# Patient Record
Sex: FEMALE | Race: OTHER | HISPANIC OR LATINO | URBAN - METROPOLITAN AREA
[De-identification: names, ages, dates, MRNs, and addresses within clinical notes are randomized per-mention and may not be internally consistent; named-entity substitution may affect disease eponyms.]

---

## 2021-05-01 ENCOUNTER — OUTPATIENT (OUTPATIENT)
Dept: OUTPATIENT SERVICES | Facility: HOSPITAL | Age: 81
LOS: 1 days | End: 2021-05-01
Payer: MEDICAID

## 2021-05-14 ENCOUNTER — INPATIENT (INPATIENT)
Facility: HOSPITAL | Age: 81
LOS: 7 days | Discharge: HOME | DRG: 64 | End: 2021-05-22
Attending: HOSPITALIST | Admitting: HOSPITALIST
Payer: MEDICAID

## 2021-05-14 VITALS
WEIGHT: 126.1 LBS | DIASTOLIC BLOOD PRESSURE: 83 MMHG | RESPIRATION RATE: 20 BRPM | HEART RATE: 77 BPM | SYSTOLIC BLOOD PRESSURE: 143 MMHG | OXYGEN SATURATION: 100 %

## 2021-05-14 DIAGNOSIS — R13.10 DYSPHAGIA, UNSPECIFIED: ICD-10-CM

## 2021-05-14 DIAGNOSIS — R41.82 ALTERED MENTAL STATUS, UNSPECIFIED: ICD-10-CM

## 2021-05-14 LAB
ALBUMIN SERPL ELPH-MCNC: 3.1 G/DL — LOW (ref 3.5–5)
ALP SERPL-CCNC: 175 U/L — HIGH (ref 40–120)
ALT FLD-CCNC: 31 U/L DA — SIGNIFICANT CHANGE UP (ref 10–60)
ANION GAP SERPL CALC-SCNC: 6 MMOL/L — SIGNIFICANT CHANGE UP (ref 5–17)
APPEARANCE UR: ABNORMAL
AST SERPL-CCNC: 24 U/L — SIGNIFICANT CHANGE UP (ref 10–40)
BACTERIA # UR AUTO: ABNORMAL /HPF
BASOPHILS # BLD AUTO: 0.03 K/UL — SIGNIFICANT CHANGE UP (ref 0–0.2)
BASOPHILS NFR BLD AUTO: 0.4 % — SIGNIFICANT CHANGE UP (ref 0–2)
BILIRUB SERPL-MCNC: 0.9 MG/DL — SIGNIFICANT CHANGE UP (ref 0.2–1.2)
BILIRUB UR-MCNC: NEGATIVE — SIGNIFICANT CHANGE UP
BUN SERPL-MCNC: 16 MG/DL — SIGNIFICANT CHANGE UP (ref 7–18)
CALCIUM SERPL-MCNC: 8.9 MG/DL — SIGNIFICANT CHANGE UP (ref 8.4–10.5)
CHLORIDE SERPL-SCNC: 101 MMOL/L — SIGNIFICANT CHANGE UP (ref 96–108)
CO2 SERPL-SCNC: 26 MMOL/L — SIGNIFICANT CHANGE UP (ref 22–31)
COLOR SPEC: YELLOW — SIGNIFICANT CHANGE UP
COMMENT - URINE: SIGNIFICANT CHANGE UP
CREAT SERPL-MCNC: 0.78 MG/DL — SIGNIFICANT CHANGE UP (ref 0.5–1.3)
DIFF PNL FLD: ABNORMAL
EOSINOPHIL # BLD AUTO: 0.07 K/UL — SIGNIFICANT CHANGE UP (ref 0–0.5)
EOSINOPHIL NFR BLD AUTO: 0.9 % — SIGNIFICANT CHANGE UP (ref 0–6)
EPI CELLS # UR: SIGNIFICANT CHANGE UP /HPF
GLUCOSE SERPL-MCNC: 292 MG/DL — HIGH (ref 70–99)
GLUCOSE UR QL: 1000 MG/DL
HCT VFR BLD CALC: 40.3 % — SIGNIFICANT CHANGE UP (ref 34.5–45)
HGB BLD-MCNC: 14.2 G/DL — SIGNIFICANT CHANGE UP (ref 11.5–15.5)
IMM GRANULOCYTES NFR BLD AUTO: 0.5 % — SIGNIFICANT CHANGE UP (ref 0–1.5)
KETONES UR-MCNC: NEGATIVE — SIGNIFICANT CHANGE UP
LEUKOCYTE ESTERASE UR-ACNC: ABNORMAL
LYMPHOCYTES # BLD AUTO: 1.62 K/UL — SIGNIFICANT CHANGE UP (ref 1–3.3)
LYMPHOCYTES # BLD AUTO: 21.7 % — SIGNIFICANT CHANGE UP (ref 13–44)
MCHC RBC-ENTMCNC: 31.3 PG — SIGNIFICANT CHANGE UP (ref 27–34)
MCHC RBC-ENTMCNC: 35.2 GM/DL — SIGNIFICANT CHANGE UP (ref 32–36)
MCV RBC AUTO: 89 FL — SIGNIFICANT CHANGE UP (ref 80–100)
MONOCYTES # BLD AUTO: 0.54 K/UL — SIGNIFICANT CHANGE UP (ref 0–0.9)
MONOCYTES NFR BLD AUTO: 7.2 % — SIGNIFICANT CHANGE UP (ref 2–14)
NEUTROPHILS # BLD AUTO: 5.16 K/UL — SIGNIFICANT CHANGE UP (ref 1.8–7.4)
NEUTROPHILS NFR BLD AUTO: 69.3 % — SIGNIFICANT CHANGE UP (ref 43–77)
NITRITE UR-MCNC: NEGATIVE — SIGNIFICANT CHANGE UP
NRBC # BLD: 0 /100 WBCS — SIGNIFICANT CHANGE UP (ref 0–0)
PH UR: 6 — SIGNIFICANT CHANGE UP (ref 5–8)
PLATELET # BLD AUTO: 237 K/UL — SIGNIFICANT CHANGE UP (ref 150–400)
POTASSIUM SERPL-MCNC: 4 MMOL/L — SIGNIFICANT CHANGE UP (ref 3.5–5.3)
POTASSIUM SERPL-SCNC: 4 MMOL/L — SIGNIFICANT CHANGE UP (ref 3.5–5.3)
PROT SERPL-MCNC: 7.4 G/DL — SIGNIFICANT CHANGE UP (ref 6–8.3)
PROT UR-MCNC: 100
RBC # BLD: 4.53 M/UL — SIGNIFICANT CHANGE UP (ref 3.8–5.2)
RBC # FLD: 12.5 % — SIGNIFICANT CHANGE UP (ref 10.3–14.5)
RBC CASTS # UR COMP ASSIST: ABNORMAL /HPF (ref 0–2)
SARS-COV-2 RNA SPEC QL NAA+PROBE: SIGNIFICANT CHANGE UP
SODIUM SERPL-SCNC: 133 MMOL/L — LOW (ref 135–145)
SP GR SPEC: 1.01 — SIGNIFICANT CHANGE UP (ref 1.01–1.02)
UROBILINOGEN FLD QL: NEGATIVE — SIGNIFICANT CHANGE UP
WBC # BLD: 7.46 K/UL — SIGNIFICANT CHANGE UP (ref 3.8–10.5)
WBC # FLD AUTO: 7.46 K/UL — SIGNIFICANT CHANGE UP (ref 3.8–10.5)
WBC UR QL: ABNORMAL /HPF (ref 0–5)

## 2021-05-14 PROCEDURE — 99285 EMERGENCY DEPT VISIT HI MDM: CPT

## 2021-05-14 PROCEDURE — 99222 1ST HOSP IP/OBS MODERATE 55: CPT

## 2021-05-14 PROCEDURE — 71045 X-RAY EXAM CHEST 1 VIEW: CPT | Mod: 26

## 2021-05-14 PROCEDURE — 70450 CT HEAD/BRAIN W/O DYE: CPT | Mod: 26,MA

## 2021-05-14 RX ORDER — ENOXAPARIN SODIUM 100 MG/ML
40 INJECTION SUBCUTANEOUS DAILY
Refills: 0 | Status: DISCONTINUED | OUTPATIENT
Start: 2021-05-14 | End: 2021-05-19

## 2021-05-14 RX ORDER — HALOPERIDOL DECANOATE 100 MG/ML
5 INJECTION INTRAMUSCULAR ONCE
Refills: 0 | Status: COMPLETED | OUTPATIENT
Start: 2021-05-14 | End: 2021-05-14

## 2021-05-14 RX ORDER — INSULIN LISPRO 100/ML
VIAL (ML) SUBCUTANEOUS
Refills: 0 | Status: DISCONTINUED | OUTPATIENT
Start: 2021-05-14 | End: 2021-05-15

## 2021-05-14 RX ADMIN — HALOPERIDOL DECANOATE 5 MILLIGRAM(S): 100 INJECTION INTRAMUSCULAR at 21:14

## 2021-05-14 RX ADMIN — HALOPERIDOL DECANOATE 5 MILLIGRAM(S): 100 INJECTION INTRAMUSCULAR at 19:00

## 2021-05-14 RX ADMIN — Medication 2 MILLIGRAM(S): at 21:14

## 2021-05-14 NOTE — ED PROVIDER NOTE - CLINICAL SUMMARY MEDICAL DECISION MAKING FREE TEXT BOX
Patient presenting for weakness. no objective weakness here but noting ams. per patient's daughter, patient weakness worsening over past 2 days. will obtain lab, ua, ct. assess for infection vs stroke. ed obs and reassess

## 2021-05-14 NOTE — H&P ADULT - NSHPREVIEWOFSYSTEMS_GEN_ALL_CORE
GENERAL:  HEENT:  RESP:  CV:  GI:  :  MSK:  EXT:  NEURO:  PSYCH:  SKIN: unable to obtain 2/2 sedation

## 2021-05-14 NOTE — ED PROVIDER NOTE - OBJECTIVE STATEMENT
80 y.o presenting for weakness. history taken from patient daughter. per patient daughter, noting left sided weakness x 2 years but worse in the past 2 days. no cp, n, v, abd pain noted.

## 2021-05-14 NOTE — H&P ADULT - NSHPPHYSICALEXAM_GEN_ALL_CORE
HR 77, /83, RR 20, SpO2 100%  GENERAL:  HEENT:  RESP:  CV:  GI:  :  MSK:  EXT:  NEURO:  PSYCH:  SKIN: T 97.6, HR 77, /83, RR 20, SpO2 100%  GENERAL:  HEENT:  RESP:  CV:  GI:  :  MSK:  EXT:  NEURO:  PSYCH:  SKIN: T 97.6, HR 77, /83, RR 20, SpO2 100%  GENERAL: sedated  HEENT: NCAT  RESP: deep, even breathing, lungs CTA b/l  CV: RRR  GI: soft, ND  MSK: atraumatic   EXT: no edema noted   NEURO: sedated, retracts to pain (s/p 10mg Haldol)  PSYCH: unable to assess  SKIN: no lesions noted, intact, warm

## 2021-05-14 NOTE — H&P ADULT - PROBLEM SELECTOR PLAN 5
-s/p COVID infx 11/2020  -adm COVID PCR negative   -CXR with mild b/l infiltrates, possibly residual finding

## 2021-05-14 NOTE — H&P ADULT - HISTORY OF PRESENT ILLNESS
Na 133, ,  UA+ COVID negative. CXR mild b/l infiltrates. CTH with chronic l frontal lobe and r inferoir cerebellar lacunar infarcts, no acute findings.      80F from home with daughters, arrived from [facility] 2/21, ambulates only with assistance with PMH DM, HTN, CVA 2019 (residual r-sided weakness), COVID 11/20, and Alzheimer dementia (baseline AAOx0-2), sent by PCP Dr. Gilmore 5/14 for nonspecific reports of "feeling poorly", cognitive decline, and x2 days questionably worsening weakness (daughter reported right side, ED note reported left side, will continue to clarify). In ED, patient agitated, confused, disoriented, given 10mg Haldol IV and 2mg Ativan IV, unable to provide history, obtained from daughter Leeanne.     In ED, BP mildly elevated 143/83, VS otherwise wnl. Adm Na 133, , , +UA. COVID negative. CXR mild b/l infiltrates. CTH with chronic l frontal lobe and r inferior cerebellar lacunar infarcts, no acute findings. Admitted to tele for workup TIA vs CVA.      80F from home with daughters, arrived from [facility] 2/21, ambulates only with assistance with PMH DM, HTN, CVA 2019 (residual r-sided weakness), COVID 11/20, and Alzheimer dementia (baseline AAOx0-2), sent by PCP Dr. Gilmore 5/14 for nonspecific reports of "feeling poorly", cognitive decline, and x2 days questionably worsening weakness (daughter reported right side, ED note reported left side, will continue to clarify). In ED, patient agitated, confused, disoriented, given 10mg Haldol IV and 2mg Ativan IV, unable to provide history, obtained from daughter Leeanne.     In ED, BP mildly elevated 143/83, VS otherwise wnl. Adm Na 133, , , +UA. COVID negative. EKG NSR with incomplete RBBB. CXR mild b/l infiltrates. CTH with chronic l frontal lobe and r inferior cerebellar lacunar infarcts, no acute findings. Admitted to [facility] for workup TIA vs CVA.      80F from home with daughters, arrived from [facility] 2/21, ambulates only with assistance with PMH DM, HTN, CVA 2019 (residual l-sided weakness), COVID 11/20, and Alzheimer dementia (baseline AAOx0-2), sent by PCP Dr. Gilmore 5/14 for nonspecific reports of "feeling poorly", cognitive decline, and x2 days questionably worsening l-sided weakness. In ED, patient agitated, confused, disoriented, given 10mg Haldol IV and 2mg Ativan IV, unable to provide history, obtained from daughter Leeanne.     In ED, BP mildly elevated 143/83, VS otherwise wnl. Adm Na 133, , , +UA. COVID negative. EKG NSR with incomplete RBBB. CXR mild b/l infiltrates. CTH with chronic l frontal lobe and r inferior cerebellar lacunar infarcts, no acute findings. Admitted to [facility] for workup of worsening mental status.

## 2021-05-14 NOTE — H&P ADULT - PROBLEM SELECTOR PLAN 3
PMH DM, no home meds per daughter 2/2 "insurance issues"  -adm   -started HSS q6 while NPO  -fu a1c  -once diet started, diabetic/DASH  -fu SW consult for insurance issues PMH DM, no home meds per daughter 2/2 "insurance issues"  -adm   -started HSS q6 while NPO  -fu a1c  -once diet started, diabetic/DASH  -primary team to consult endo in am  -fu SW consult for insurance issues

## 2021-05-14 NOTE — ED ADULT NURSE NOTE - CHIEF COMPLAINT QUOTE
sent from the doctors office for r/o stroke , as per daughter Jazmyn pt had been having weakness ever since she came back from [facility]  2 months ago

## 2021-05-14 NOTE — ED ADULT TRIAGE NOTE - CHIEF COMPLAINT QUOTE
sent from the doctors office for r/o stroke , as per daughter Jazmyn pt had been like this ever since she came back from [facility]  2 months ago sent from the doctors office for r/o stroke , as per daughter Jazmyn pt had been having weakness ever since she came back from [facility]  2 months ago

## 2021-05-14 NOTE — ED ADULT NURSE NOTE - INTERVENTIONS DEFINITIONS
Stretcher in lowest position, wheels locked, appropriate side rails in place/Monitor gait and stability

## 2021-05-14 NOTE — H&P ADULT - PROBLEM SELECTOR PLAN 2
-adm +UA, patient unable to respond regarding symptoms  -fu UCx, BCx  -give x3 days cftx empirically

## 2021-05-14 NOTE — H&P ADULT - ASSESSMENT
80F from home with daughters, arrived from [facility] 2/21, ambulates only with assistance with PMH DM, HTN, CVA 2019 (residual r-sided weakness), COVID 11/20, and Alzheimer dementia (baseline AAOx0-2),   In ED, BP mildly elevated 143/83, VS otherwise wnl. Adm Na 133, , , +UA. COVID negative.. CXR mild b/l infiltrates. . Admitted to tele for workup TIA vs CVA.        80F from home with daughters, arrived from [facility] 2/21, ambulates only with assistance with PMH DM, HTN, CVA 2019 (residual l-sided weakness), COVID 11/20, and Alzheimer dementia (baseline AAOx0-2), sent by PCP Dr. Gilmore 5/14 for nonspecific reports of "feeling poorly", cognitive decline, and x2 days questionably worsening weakness, admitted to tele for workup of worsening mental status.

## 2021-05-14 NOTE — H&P ADULT - NSHPSOCIALHISTORY_GEN_ALL_CORE
lives with daughters  unable to ambulate without assistance lives with daughters  unable to ambulate without assistance  AAOx0-2 at baseline  never smoker, no EtOH, no illicit drugs  recently arrived from [facility], 2/21

## 2021-05-14 NOTE — H&P ADULT - PROBLEM SELECTOR PLAN 1
sent by PCP Dr. Gilmore 5/14 for nonspecific reports of "feeling poorly", cognitive decline, and x2 days questionably worsening weakness (daughter reported right side, ED note reported left side, will continue to clarify) -s/p Haldol 10mg IV and Ativan 2mg IV 2/2 agitation in ED  -EKG NSR with incomplete RBBB  -CTH with chronic l frontal lobe and r inferior cerebellar lacunar infarcts, no acute findings  -may consider further evaluation with repeat CTH or MRI 5/15pm  -fu carotid doppler and TTE given bilaterality of previous CVAs sent by PCP Dr. Gilmore 5/14 for nonspecific reports of "feeling poorly", cognitive decline, and x2 days questionably worsening weakness (daughter reported right side, ED note reported left side, will continue to clarify)   -ddx CVA/TIA vs infectious (see below) vs progression of Amzheimer's dz  -s/p Haldol 10mg IV and Ativan 2mg IV 2/2 agitation in ED  -EKG NSR with incomplete RBBB  -CTH with chronic l frontal lobe and r inferior cerebellar lacunar infarcts, no acute findings  -may consider further evaluation with repeat CTH, CTA H/N or MRI 5/15pm  -fu carotid doppler (if CTA H/N not obtained) and TTE given bilaterality of previous CVAs  -start asa, statin, and lisinopril for secondary prevention   -fu lipid profile, folate, B12, TSH  -NPO until SS eval (except meds, ice chips)  -fu PT recs sent by PCP Dr. Gilmore 5/14 for nonspecific reports of "feeling poorly", cognitive decline, and x2 days questionably worsening weakness (daughter reported right side, ED note reported left side, will continue to clarify)   -in ED, afebrile, BP mildly elevated, VS otherwise wnl  -adm Na 133,   -ddx CVA/TIA vs infectious (see below) vs progression of Alzheimer's dz  -s/p Haldol 10mg IV and Ativan 2mg IV 2/2 agitation in ED  -EKG NSR with incomplete RBBB  -CTH with chronic l frontal lobe and r inferior cerebellar lacunar infarcts, no acute findings  -may consider further evaluation with repeat CTH, CTA H/N or MRI 5/15pm  -fu carotid doppler (if CTA H/N not obtained) and TTE given bilaterality of previous CVAs  -tele monitoring  -start asa, statin, and lisinopril for secondary prevention   -fu lipid profile, folate, B12, TSH, vit d  -NPO until SS eval (except meds, ice chips)  -consult neuro Dr. Cast and cards Dr. Peñaloza in am  -fu PT recs sent by PCP Dr. Gilmore 5/14 for nonspecific reports of "feeling poorly", cognitive decline, and x2 days questionably worsening l-sided weakness   -in ED, afebrile, BP mildly elevated, VS otherwise wnl  -adm Na 133, , monitor routine daily labs  -ddx progression of Alzheimer's dz vs CVA/TIA vs infectious (see below section)   -s/p Haldol 10mg IV and Ativan 2mg IV 2/2 agitation in ED  -EKG NSR with incomplete RBBB  -CTH with chronic l frontal lobe and r inferior cerebellar lacunar infarcts, no acute findings  -will hold off further evaluation with repeat CTH, CTA H/N or MRI as clinical picture does not fit acute neuro event  -may consider carotid doppler and TTE given bilaterality of previous CVAs  -tele monitoring for 24 hrs as precaution   -start asa, statin, and lisinopril for secondary prevention   -fu lipid profile, folate, B12, TSH, vit d (cva risk factors, reversible dementia causes)  -NPO until SS eval (except meds, ice chips)  -fu PT recs  -SW consulted for placement   -fall and aspiration precautions

## 2021-05-14 NOTE — H&P ADULT - PROBLEM SELECTOR PLAN 7
-improve 2-4 (age, immobilization, chronic LLE weakness/paralysis)  -DVT PPX SQL  -no need for GI PPX at this time

## 2021-05-14 NOTE — H&P ADULT - ATTENDING COMMENTS
Pt seen and examined.  Case discussed with Housestaff  hx obtained from family via phone @  [phone].  She is an 80 year old woman with only Alzheimer's' dx and DM2 revealed as her medical hx per family. The family were told she has Alzheimer's dementia in [facility] about 3 years ago and she has displayed gradual decline in her memory, cognitive functioning and ADLs and is almost completely bedbound at this point.   She had returned from [facility] 3 months ago having spent 2 years there. She had just re-instated her medical insurance and today was her 1st PCP visit. The PCP had told the to bring her here for stroke work up.   However family states the "left sided weakness" is not new and has been present for months to years.  ROS with family does not indicate any new or acute findings but only that there has been gradual deterioration with her health - mental and physical generally. Pt seen and examined.  Case discussed with Housestaff  hx obtained from family via phone @  [phone].  She is an 80 year old woman with only Alzheimer's' dx and DM2 revealed as her medical hx per family. The family were told she has Alzheimer's dementia in [facility] about 3 years ago and she has displayed gradual decline in her memory, cognitive functioning and ADLs and is almost completely bedbound at this point.   She had returned from [facility] 3 months ago having spent 2 years there. She had just re-instated her medical insurance and today was her 1st PCP visit. The PCP had told the to bring her here for stroke work up.   However family states the "left sided weakness" is not new and has been present for months to years.  ROS with family does not indicate any new or acute findings but only that there has been gradual deterioration with her health - mental and physical generally.    Vital Signs Last 24 Hrs  T(C): 36.4 (15 May 2021 00:04), Max: 36.4 (15 May 2021 00:04)  T(F): 97.6 (15 May 2021 00:04), Max: 97.6 (15 May 2021 00:04)  HR: 61 (15 May 2021 00:04) (61 - 77)  BP: 115/73 (15 May 2021 00:04) (115/73 - 143/83)  RR: 20 (15 May 2021 00:04) (20 - 20)  SpO2: 98% (15 May 2021 00:04) (98% - 100%)    Exam   pt received sedation before imaging studies due to agitation  Presently sleeping deeply   Only able to grunt to pain with flexion and no eye opening.  Anterior chest - good air entry; maintaining airway   RRR S1S2 only    rest of exams deferred    Labs                         14.2   7.46  )-----------( 237      ( 14 May 2021 18:07 )             40.3     05-14    133<L>  |  101  |  16  ----------------------------<  292<H>  4.0   |  26  |  0.78    Ca    8.9      14 May 2021 18:07    TPro  7.4  /  Alb  3.1<L>  /  TBili  0.9  /  DBili  x   /  AST  24  /  ALT  31  /  AlkPhos  175<H>  05-14    Urinalysis Basic - ( 14 May 2021 22:27 )    Color: Yellow / Appearance: Slightly Turbid / S.015 / pH: x  Gluc: x / Ketone: Negative  / Bili: Negative / Urobili: Negative   Blood: x / Protein: 100 / Nitrite: Negative   Leuk Esterase: Small / RBC: 2-5 /HPF / WBC 11-25 /HPF   Sq Epi: x / Non Sq Epi: Few /HPF / Bacteria: Many /HPF    CT head   No intracranial hemorrhage or mass effect. Encephalomalacia/gliosis in the left frontal lobe, likely due to prior infarct. Chronic right inferior cerebellar lacunar infarct.    Impression   - Chronic encephalopathy likely from progressive advanced dementia  - UTI   - Hyperglycemia from suboptimal DM2 control    Plan   - Admit to Medicine   - No clinical evidence to support CVA   - Fall risk  -  Urine culture  - Empiric antibiotics for UTI  -  RISS for DM2; POC glucose; A1c, lipid panel  - Endocrinology consult  - Code status; palliative care consult  - PT evaluation and treatment  -SW / case mgt consult for safe discharge planning

## 2021-05-14 NOTE — H&P ADULT - NSICDXPASTMEDICALHX_GEN_ALL_CORE_FT
PAST MEDICAL HISTORY:  Alzheimer's dementia     Cerebrovascular accident (CVA) 2019, residual weakness, laterally unclear    DM (diabetes mellitus)     History of 2019 novel coronavirus disease (COVID-19)     HTN (hypertension)      PAST MEDICAL HISTORY:  Alzheimer's dementia     Cerebrovascular accident (CVA) 2019, residual l-sided weakness    DM (diabetes mellitus)     History of 2019 novel coronavirus disease (COVID-19)     HTN (hypertension)

## 2021-05-15 DIAGNOSIS — Z71.89 OTHER SPECIFIED COUNSELING: ICD-10-CM

## 2021-05-15 DIAGNOSIS — E11.9 TYPE 2 DIABETES MELLITUS WITHOUT COMPLICATIONS: ICD-10-CM

## 2021-05-15 DIAGNOSIS — I10 ESSENTIAL (PRIMARY) HYPERTENSION: ICD-10-CM

## 2021-05-15 DIAGNOSIS — G30.9 ALZHEIMER'S DISEASE, UNSPECIFIED: ICD-10-CM

## 2021-05-15 DIAGNOSIS — E11.65 TYPE 2 DIABETES MELLITUS WITH HYPERGLYCEMIA: ICD-10-CM

## 2021-05-15 DIAGNOSIS — R41.82 ALTERED MENTAL STATUS, UNSPECIFIED: ICD-10-CM

## 2021-05-15 DIAGNOSIS — Z29.9 ENCOUNTER FOR PROPHYLACTIC MEASURES, UNSPECIFIED: ICD-10-CM

## 2021-05-15 DIAGNOSIS — Z86.16 PERSONAL HISTORY OF COVID-19: ICD-10-CM

## 2021-05-15 DIAGNOSIS — N39.0 URINARY TRACT INFECTION, SITE NOT SPECIFIED: ICD-10-CM

## 2021-05-15 LAB
24R-OH-CALCIDIOL SERPL-MCNC: 7.3 NG/ML — LOW (ref 30–80)
ALBUMIN SERPL ELPH-MCNC: 2.3 G/DL — LOW (ref 3.5–5)
ALP SERPL-CCNC: 123 U/L — HIGH (ref 40–120)
ALT FLD-CCNC: 20 U/L DA — SIGNIFICANT CHANGE UP (ref 10–60)
ANION GAP SERPL CALC-SCNC: 6 MMOL/L — SIGNIFICANT CHANGE UP (ref 5–17)
AST SERPL-CCNC: 16 U/L — SIGNIFICANT CHANGE UP (ref 10–40)
BILIRUB SERPL-MCNC: 0.8 MG/DL — SIGNIFICANT CHANGE UP (ref 0.2–1.2)
BUN SERPL-MCNC: 16 MG/DL — SIGNIFICANT CHANGE UP (ref 7–18)
CALCIUM SERPL-MCNC: 8.4 MG/DL — SIGNIFICANT CHANGE UP (ref 8.4–10.5)
CHLORIDE SERPL-SCNC: 106 MMOL/L — SIGNIFICANT CHANGE UP (ref 96–108)
CHOLEST SERPL-MCNC: 192 MG/DL — SIGNIFICANT CHANGE UP
CO2 SERPL-SCNC: 28 MMOL/L — SIGNIFICANT CHANGE UP (ref 22–31)
CREAT SERPL-MCNC: 0.74 MG/DL — SIGNIFICANT CHANGE UP (ref 0.5–1.3)
FOLATE SERPL-MCNC: 7.8 NG/ML — SIGNIFICANT CHANGE UP
GLUCOSE BLDC GLUCOMTR-MCNC: 212 MG/DL — HIGH (ref 70–99)
GLUCOSE BLDC GLUCOMTR-MCNC: 224 MG/DL — HIGH (ref 70–99)
GLUCOSE BLDC GLUCOMTR-MCNC: 228 MG/DL — HIGH (ref 70–99)
GLUCOSE BLDC GLUCOMTR-MCNC: 228 MG/DL — HIGH (ref 70–99)
GLUCOSE BLDC GLUCOMTR-MCNC: 252 MG/DL — HIGH (ref 70–99)
GLUCOSE SERPL-MCNC: 243 MG/DL — HIGH (ref 70–99)
HCT VFR BLD CALC: 36.7 % — SIGNIFICANT CHANGE UP (ref 34.5–45)
HDLC SERPL-MCNC: 37 MG/DL — LOW
HGB BLD-MCNC: 12.5 G/DL — SIGNIFICANT CHANGE UP (ref 11.5–15.5)
LIPID PNL WITH DIRECT LDL SERPL: 120 MG/DL — HIGH
MAGNESIUM SERPL-MCNC: 1.9 MG/DL — SIGNIFICANT CHANGE UP (ref 1.6–2.6)
MCHC RBC-ENTMCNC: 31.3 PG — SIGNIFICANT CHANGE UP (ref 27–34)
MCHC RBC-ENTMCNC: 34.1 GM/DL — SIGNIFICANT CHANGE UP (ref 32–36)
MCV RBC AUTO: 92 FL — SIGNIFICANT CHANGE UP (ref 80–100)
NON HDL CHOLESTEROL: 155 MG/DL — HIGH
NRBC # BLD: 0 /100 WBCS — SIGNIFICANT CHANGE UP (ref 0–0)
PHOSPHATE SERPL-MCNC: 3.7 MG/DL — SIGNIFICANT CHANGE UP (ref 2.5–4.5)
PLATELET # BLD AUTO: 198 K/UL — SIGNIFICANT CHANGE UP (ref 150–400)
POTASSIUM SERPL-MCNC: 3.8 MMOL/L — SIGNIFICANT CHANGE UP (ref 3.5–5.3)
POTASSIUM SERPL-SCNC: 3.8 MMOL/L — SIGNIFICANT CHANGE UP (ref 3.5–5.3)
PROT SERPL-MCNC: 5.8 G/DL — LOW (ref 6–8.3)
RBC # BLD: 3.99 M/UL — SIGNIFICANT CHANGE UP (ref 3.8–5.2)
RBC # FLD: 12.8 % — SIGNIFICANT CHANGE UP (ref 10.3–14.5)
SODIUM SERPL-SCNC: 140 MMOL/L — SIGNIFICANT CHANGE UP (ref 135–145)
TRIGL SERPL-MCNC: 173 MG/DL — HIGH
TSH SERPL-MCNC: 3.55 UU/ML — SIGNIFICANT CHANGE UP (ref 0.34–4.82)
VIT B12 SERPL-MCNC: 858 PG/ML — SIGNIFICANT CHANGE UP (ref 232–1245)
VIT D25+D1,25 OH+D1,25 PNL SERPL-MCNC: 34.7 PG/ML — SIGNIFICANT CHANGE UP (ref 19.9–79.3)
WBC # BLD: 6.7 K/UL — SIGNIFICANT CHANGE UP (ref 3.8–10.5)
WBC # FLD AUTO: 6.7 K/UL — SIGNIFICANT CHANGE UP (ref 3.8–10.5)

## 2021-05-15 PROCEDURE — 99233 SBSQ HOSP IP/OBS HIGH 50: CPT

## 2021-05-15 RX ORDER — LISINOPRIL 2.5 MG/1
2.5 TABLET ORAL DAILY
Refills: 0 | Status: DISCONTINUED | OUTPATIENT
Start: 2021-05-15 | End: 2021-05-19

## 2021-05-15 RX ORDER — ERGOCALCIFEROL 1.25 MG/1
50000 CAPSULE ORAL
Refills: 0 | Status: DISCONTINUED | OUTPATIENT
Start: 2021-05-15 | End: 2021-05-19

## 2021-05-15 RX ORDER — INSULIN LISPRO 100/ML
VIAL (ML) SUBCUTANEOUS
Refills: 0 | Status: DISCONTINUED | OUTPATIENT
Start: 2021-05-15 | End: 2021-05-19

## 2021-05-15 RX ORDER — SODIUM CHLORIDE 9 MG/ML
1000 INJECTION, SOLUTION INTRAVENOUS
Refills: 0 | Status: DISCONTINUED | OUTPATIENT
Start: 2021-05-15 | End: 2021-05-17

## 2021-05-15 RX ORDER — CEFTRIAXONE 500 MG/1
1000 INJECTION, POWDER, FOR SOLUTION INTRAMUSCULAR; INTRAVENOUS EVERY 24 HOURS
Refills: 0 | Status: COMPLETED | OUTPATIENT
Start: 2021-05-15 | End: 2021-05-17

## 2021-05-15 RX ORDER — ASPIRIN/CALCIUM CARB/MAGNESIUM 324 MG
81 TABLET ORAL DAILY
Refills: 0 | Status: DISCONTINUED | OUTPATIENT
Start: 2021-05-15 | End: 2021-05-19

## 2021-05-15 RX ORDER — INSULIN GLARGINE 100 [IU]/ML
10 INJECTION, SOLUTION SUBCUTANEOUS AT BEDTIME
Refills: 0 | Status: DISCONTINUED | OUTPATIENT
Start: 2021-05-15 | End: 2021-05-16

## 2021-05-15 RX ORDER — ATORVASTATIN CALCIUM 80 MG/1
40 TABLET, FILM COATED ORAL AT BEDTIME
Refills: 0 | Status: DISCONTINUED | OUTPATIENT
Start: 2021-05-15 | End: 2021-05-19

## 2021-05-15 RX ADMIN — CEFTRIAXONE 100 MILLIGRAM(S): 500 INJECTION, POWDER, FOR SOLUTION INTRAMUSCULAR; INTRAVENOUS at 05:46

## 2021-05-15 RX ADMIN — Medication 2: at 17:22

## 2021-05-15 RX ADMIN — ENOXAPARIN SODIUM 40 MILLIGRAM(S): 100 INJECTION SUBCUTANEOUS at 00:02

## 2021-05-15 RX ADMIN — SODIUM CHLORIDE 60 MILLILITER(S): 9 INJECTION, SOLUTION INTRAVENOUS at 17:22

## 2021-05-15 RX ADMIN — ENOXAPARIN SODIUM 40 MILLIGRAM(S): 100 INJECTION SUBCUTANEOUS at 11:49

## 2021-05-15 RX ADMIN — INSULIN GLARGINE 10 UNIT(S): 100 INJECTION, SOLUTION SUBCUTANEOUS at 22:36

## 2021-05-15 RX ADMIN — Medication 81 MILLIGRAM(S): at 11:49

## 2021-05-15 RX ADMIN — ERGOCALCIFEROL 50000 UNIT(S): 1.25 CAPSULE ORAL at 14:30

## 2021-05-15 RX ADMIN — ATORVASTATIN CALCIUM 40 MILLIGRAM(S): 80 TABLET, FILM COATED ORAL at 22:27

## 2021-05-15 RX ADMIN — Medication 2: at 11:50

## 2021-05-15 NOTE — CONSULT NOTE ADULT - ASSESSMENT
80F from home with daughters, arrived from [facility] 2/21, ambulates only with assistance with PMH DM, HTN, CVA 2019 (residual l-sided weakness), COVID 11/20, and Alzheimer dementia (baseline AAOx0-2), sent by PCP Dr. Gilmore 5/14 for nonspecific reports of "feeling poorly", cognitive decline, and x2 days questionably worsening l-sided weakness.  Found to have uncont dm.

## 2021-05-15 NOTE — CONSULT NOTE ADULT - PROBLEM SELECTOR RECOMMENDATION 9
hyperglycemic on no out pt meds  check a1c level  start lantus 10 units   admelog prn q6  as pt NPO  fsg q6

## 2021-05-16 LAB
A1C WITH ESTIMATED AVERAGE GLUCOSE RESULT: 14.9 % — HIGH (ref 4–5.6)
ALBUMIN SERPL ELPH-MCNC: 2.6 G/DL — LOW (ref 3.5–5)
ALP SERPL-CCNC: 176 U/L — HIGH (ref 40–120)
ALT FLD-CCNC: 37 U/L DA — SIGNIFICANT CHANGE UP (ref 10–60)
ANION GAP SERPL CALC-SCNC: 8 MMOL/L — SIGNIFICANT CHANGE UP (ref 5–17)
AST SERPL-CCNC: 34 U/L — SIGNIFICANT CHANGE UP (ref 10–40)
BILIRUB SERPL-MCNC: 1.2 MG/DL — SIGNIFICANT CHANGE UP (ref 0.2–1.2)
BUN SERPL-MCNC: 14 MG/DL — SIGNIFICANT CHANGE UP (ref 7–18)
CALCIUM SERPL-MCNC: 8.8 MG/DL — SIGNIFICANT CHANGE UP (ref 8.4–10.5)
CHLORIDE SERPL-SCNC: 107 MMOL/L — SIGNIFICANT CHANGE UP (ref 96–108)
CO2 SERPL-SCNC: 26 MMOL/L — SIGNIFICANT CHANGE UP (ref 22–31)
COVID-19 SPIKE DOMAIN AB INTERP: POSITIVE
COVID-19 SPIKE DOMAIN ANTIBODY RESULT: >250 U/ML — HIGH
CREAT SERPL-MCNC: 0.79 MG/DL — SIGNIFICANT CHANGE UP (ref 0.5–1.3)
ESTIMATED AVERAGE GLUCOSE: 381 MG/DL — HIGH (ref 68–114)
FOLATE SERPL-MCNC: 8.8 NG/ML — SIGNIFICANT CHANGE UP
GLUCOSE BLDC GLUCOMTR-MCNC: 125 MG/DL — HIGH (ref 70–99)
GLUCOSE BLDC GLUCOMTR-MCNC: 125 MG/DL — HIGH (ref 70–99)
GLUCOSE BLDC GLUCOMTR-MCNC: 169 MG/DL — HIGH (ref 70–99)
GLUCOSE BLDC GLUCOMTR-MCNC: 217 MG/DL — HIGH (ref 70–99)
GLUCOSE SERPL-MCNC: 119 MG/DL — HIGH (ref 70–99)
HCT VFR BLD CALC: 39.5 % — SIGNIFICANT CHANGE UP (ref 34.5–45)
HGB BLD-MCNC: 13.3 G/DL — SIGNIFICANT CHANGE UP (ref 11.5–15.5)
MCHC RBC-ENTMCNC: 30.8 PG — SIGNIFICANT CHANGE UP (ref 27–34)
MCHC RBC-ENTMCNC: 33.7 GM/DL — SIGNIFICANT CHANGE UP (ref 32–36)
MCV RBC AUTO: 91.4 FL — SIGNIFICANT CHANGE UP (ref 80–100)
NRBC # BLD: 0 /100 WBCS — SIGNIFICANT CHANGE UP (ref 0–0)
PLATELET # BLD AUTO: 241 K/UL — SIGNIFICANT CHANGE UP (ref 150–400)
POTASSIUM SERPL-MCNC: 3.6 MMOL/L — SIGNIFICANT CHANGE UP (ref 3.5–5.3)
POTASSIUM SERPL-SCNC: 3.6 MMOL/L — SIGNIFICANT CHANGE UP (ref 3.5–5.3)
PROT SERPL-MCNC: 6.8 G/DL — SIGNIFICANT CHANGE UP (ref 6–8.3)
RBC # BLD: 4.32 M/UL — SIGNIFICANT CHANGE UP (ref 3.8–5.2)
RBC # FLD: 12.3 % — SIGNIFICANT CHANGE UP (ref 10.3–14.5)
SARS-COV-2 IGG+IGM SERPL QL IA: >250 U/ML — HIGH
SARS-COV-2 IGG+IGM SERPL QL IA: POSITIVE
SODIUM SERPL-SCNC: 141 MMOL/L — SIGNIFICANT CHANGE UP (ref 135–145)
TSH SERPL-MCNC: 3.75 UU/ML — SIGNIFICANT CHANGE UP (ref 0.34–4.82)
WBC # BLD: 6.77 K/UL — SIGNIFICANT CHANGE UP (ref 3.8–10.5)
WBC # FLD AUTO: 6.77 K/UL — SIGNIFICANT CHANGE UP (ref 3.8–10.5)

## 2021-05-16 PROCEDURE — 71045 X-RAY EXAM CHEST 1 VIEW: CPT | Mod: 26

## 2021-05-16 PROCEDURE — 99223 1ST HOSP IP/OBS HIGH 75: CPT

## 2021-05-16 PROCEDURE — 99233 SBSQ HOSP IP/OBS HIGH 50: CPT

## 2021-05-16 RX ORDER — INSULIN GLARGINE 100 [IU]/ML
15 INJECTION, SOLUTION SUBCUTANEOUS AT BEDTIME
Refills: 0 | Status: DISCONTINUED | OUTPATIENT
Start: 2021-05-16 | End: 2021-05-17

## 2021-05-16 RX ADMIN — ENOXAPARIN SODIUM 40 MILLIGRAM(S): 100 INJECTION SUBCUTANEOUS at 12:18

## 2021-05-16 RX ADMIN — LISINOPRIL 2.5 MILLIGRAM(S): 2.5 TABLET ORAL at 05:33

## 2021-05-16 RX ADMIN — ATORVASTATIN CALCIUM 40 MILLIGRAM(S): 80 TABLET, FILM COATED ORAL at 21:26

## 2021-05-16 RX ADMIN — Medication 1: at 17:27

## 2021-05-16 RX ADMIN — CEFTRIAXONE 100 MILLIGRAM(S): 500 INJECTION, POWDER, FOR SOLUTION INTRAMUSCULAR; INTRAVENOUS at 05:33

## 2021-05-16 RX ADMIN — Medication 81 MILLIGRAM(S): at 12:17

## 2021-05-16 RX ADMIN — SODIUM CHLORIDE 60 MILLILITER(S): 9 INJECTION, SOLUTION INTRAVENOUS at 08:03

## 2021-05-16 RX ADMIN — Medication 2: at 08:02

## 2021-05-16 RX ADMIN — INSULIN GLARGINE 15 UNIT(S): 100 INJECTION, SOLUTION SUBCUTANEOUS at 21:27

## 2021-05-16 NOTE — PROGRESS NOTE ADULT - PROBLEM SELECTOR PLAN 3
PMH DM, no home meds per daughter 2/2 "insurance issues"  -adm   -started HSS q6 while NPO  -fu a1c  -once diet started, diabetic/DASH  -primary team to consult endo in am  -fu SW consult for insurance issues

## 2021-05-16 NOTE — PROGRESS NOTE ADULT - PROBLEM SELECTOR PLAN 2
-adm +UA, patient unable to respond regarding symptoms  -blood culture is negative  - f/u ucx  -give x3 days cftx empirically

## 2021-05-16 NOTE — CONSULT NOTE ADULT - ASSESSMENT
Assessment:  79yo RH HW with baseline LOAD, admitted for AMS.  CTh shows old encephalomalacia, likely old strokes, and diffuse atrophy.  On exam, she seems more coherent today, still disoriented.  Unable to stand and walk, but no evidence of ataxia.  Strenght seems intact.    Impression:  -AMS NOS    Plan:  -r/o toxic-metabolic causes  -can obtain EEG and MRI if pt does not improve.  -continue ASA81 and Statin for secondary preventio  -normotension  -PT/OT.

## 2021-05-16 NOTE — PROGRESS NOTE ADULT - SUBJECTIVE AND OBJECTIVE BOX
PGY-1 Progress Note discussed with attending    PAGER #: [-----] TILL 5:00 PM  PLEASE CONTACT ON CALL TEAM:  - On Call Team (Please refer to Dieter) FROM 5:00 PM - 8:30PM  - Nightfloat Team FROM 8:30 -7:30 AM    INTERVAL HPI/OVERNIGHT EVENTS: No acute events overnight.    MEDICATIONS:  aspirin enteric coated 81 milliGRAM(s) Oral daily  atorvastatin 40 milliGRAM(s) Oral at bedtime  cefTRIAXone   IVPB 1000 milliGRAM(s) IV Intermittent every 24 hours  dextrose 5% + sodium chloride 0.45%. 1000 milliLiter(s) IV Continuous <Continuous>  enoxaparin Injectable 40 milliGRAM(s) SubCutaneous daily  ergocalciferol 70832 Unit(s) Oral <User Schedule>  insulin glargine Injectable (LANTUS) 10 Unit(s) SubCutaneous at bedtime  insulin lispro (ADMELOG) corrective regimen sliding scale   SubCutaneous three times a day before meals  lisinopril 2.5 milliGRAM(s) Oral daily      REVIEW OF SYSTEMS:  CONSTITUTIONAL: No fever, weight loss, or fatigue  RESPIRATORY: No cough, wheezing, chills or hemoptysis; No shortness of breath  CARDIOVASCULAR: No chest pain, palpitations, dizziness, or leg swelling  GASTROINTESTINAL: No abdominal pain. No nausea, vomiting, or hematemesis; No diarrhea or constipation. No melena or hematochezia.  GENITOURINARY: No dysuria or hematuria, urinary frequency  NEUROLOGICAL: No headaches, memory loss, loss of strength, numbness, or tremors  SKIN: No itching, burning, rashes, or lesions     Vital Signs Last 24 Hrs  T(C): 36.3 (16 May 2021 01:38), Max: 37.4 (15 May 2021 18:27)  T(F): 97.4 (16 May 2021 01:38), Max: 99.4 (15 May 2021 18:27)  HR: 82 (16 May 2021 01:38) (58 - 104)  BP: 131/60 (16 May 2021 01:38) (113/76 - 167/91)  BP(mean): --  RR: 18 (16 May 2021 01:38) (17 - 18)  SpO2: 95% (16 May 2021 01:38) (95% - 99%)    PHYSICAL EXAMINATION:  GENERAL: sedated  HEENT: NCAT  RESP: deep, even breathing, lungs CTA b/l  CV: RRR  GI: soft, ND  MSK: atraumatic   EXT: no edema noted   NEURO: sedated, retracts to pain (s/p 10mg Haldol)  PSYCH: unable to assess  SKIN: no lesions noted, intact, warm                        12.5   6.70  )-----------( 198      ( 15 May 2021 07:11 )             36.7     05-15    140  |  106  |  16  ----------------------------<  243<H>  3.8   |  28  |  0.74    Ca    8.4      15 May 2021 07:11  Phos  3.7     05-15  Mg     1.9     05-15    TPro  5.8<L>  /  Alb  2.3<L>  /  TBili  0.8  /  DBili  x   /  AST  16  /  ALT  20  /  AlkPhos  123<H>  05-15    LIVER FUNCTIONS - ( 15 May 2021 07:11 )  Alb: 2.3 g/dL / Pro: 5.8 g/dL / ALK PHOS: 123 U/L / ALT: 20 U/L DA / AST: 16 U/L / GGT: x                   CAPILLARY BLOOD GLUCOSE      RADIOLOGY & ADDITIONAL TESTS:

## 2021-05-16 NOTE — PROGRESS NOTE ADULT - ASSESSMENT
80F from home with daughters, arrived from [facility] 2/21, ambulates only with assistance with PMH DM, HTN, CVA 2019 (residual l-sided weakness), COVID 11/20, and Alzheimer dementia (baseline AAOx0-2), sent by PCP Dr. Gilmore 5/14 for nonspecific reports of "feeling poorly", cognitive decline, and x2 days questionably worsening weakness, admitted to tele for workup of worsening mental status.

## 2021-05-16 NOTE — PROGRESS NOTE ADULT - SUBJECTIVE AND OBJECTIVE BOX
Interval Events:  pt in nad    Allergies    No Known Allergies    Intolerances      Endocrine/Metabolic Medications:  atorvastatin 40 milliGRAM(s) Oral at bedtime  insulin glargine Injectable (LANTUS) 10 Unit(s) SubCutaneous at bedtime  insulin lispro (ADMELOG) corrective regimen sliding scale   SubCutaneous three times a day before meals      Vital Signs Last 24 Hrs  T(C): 36.4 (16 May 2021 04:30), Max: 37.4 (15 May 2021 18:27)  T(F): 97.5 (16 May 2021 04:30), Max: 99.4 (15 May 2021 18:27)  HR: 80 (16 May 2021 04:30) (68 - 104)  BP: 136/61 (16 May 2021 04:30) (113/76 - 167/91)  BP(mean): --  RR: 18 (16 May 2021 04:30) (17 - 18)  SpO2: 97% (16 May 2021 04:30) (95% - 99%)      PHYSICAL EXAM  All physical exam findings normal, except those marked:  General:	Alert, active, cooperative, NAD, well hydrated  .		[] Abnormal:  Neck		Normal: supple, no cervical adenopathy, no palpable thyroid  .		[] Abnormal:  Cardiovascular	Normal: regular rate, normal S1, S2, no murmurs  .		[] Abnormal:  Respiratory	Normal: no chest wall deformity, normal respiratory pattern, CTA B/L  .		[] Abnormal:  Abdominal	Normal: soft, ND, NT, bowel sounds present, no masses, no organomegaly  .		[] Abnormal:  		Normal normal genitalia, testes descended, circumcised/uncircumcised  .		Tiffanie stage:			Breast tiffanie:  .		Menstrual history:  .		[] Abnormal:  Extremities	Normal: FROM x4  .		[] Abnormal:  Skin		Normal: intact and not indurated, no rash, no acanthosis nigricans  .		[] Abnormal:  Neurologic	Normal: grossly intact  .		[] Abnormal:    LABS        CAPILLARY BLOOD GLUCOSE      POCT Blood Glucose.: 217 mg/dL (16 May 2021 07:51)  POCT Blood Glucose.: 228 mg/dL (15 May 2021 22:34)  POCT Blood Glucose.: 212 mg/dL (15 May 2021 21:04)  POCT Blood Glucose.: 224 mg/dL (15 May 2021 16:48)  POCT Blood Glucose.: 228 mg/dL (15 May 2021 11:12)        Assesment/plan    80F from home with daughters, arrived from [facility] 2/21, ambulates only with assistance with PMH DM, HTN, CVA 2019 (residual l-sided weakness), COVID 11/20, and Alzheimer dementia (baseline AAOx0-2), sent by PCP Dr. Gilmore 5/14 for nonspecific reports of "feeling poorly", cognitive decline, and x2 days questionably worsening l-sided weakness.  Found to have uncont dm.        Problem/Recommendation - 1:  Problem: Uncontrolled diabetes mellitus. Recommendation: hyperglycemic on no out pt meds  check a1c level-   chnage lantus to 15 units   admelog prn q6  pt NPO- await SS eval  fsg q6.     Problem/Recommendation - 2:  ·  Problem: UTI (urinary tract infection).  Recommendation: cont iv abx.      Problem/Recommendation - 3:  ·  Problem: Altered mental status, unspecified altered mental status type.  Recommendation: ? multifactorial   hx of Alzheimer's superimposed with acute illness  w/u per prim team

## 2021-05-16 NOTE — PROGRESS NOTE ADULT - PROBLEM SELECTOR PLAN 1
sent by PCP Dr. Gilmore 5/14 for nonspecific reports of "feeling poorly", cognitive decline, and x2 days questionably worsening l-sided weakness   -in ED, afebrile, BP mildly elevated, VS otherwise wnl  -adm Na 133, , monitor routine daily labs  -ddx progression of Alzheimer's dz vs CVA/TIA vs infectious (see below section)   -s/p Haldol 10mg IV and Ativan 2mg IV 2/2 agitation in ED  -EKG NSR with incomplete RBBB  -CTH with chronic l frontal lobe and r inferior cerebellar lacunar infarcts, no acute findings  -will hold off further evaluation with repeat CTH, CTA H/N or MRI as clinical picture does not fit acute neuro event  -may consider carotid doppler and TTE given bilaterality of previous CVAs  -tele monitoring for 24 hrs as precaution   -start asa, statin, and lisinopril for secondary prevention   -NPO until SS eval (except meds, ice chips)  -fu PT recs  -SW consulted for placement   -fall and aspiration precautions

## 2021-05-17 DIAGNOSIS — Z51.5 ENCOUNTER FOR PALLIATIVE CARE: ICD-10-CM

## 2021-05-17 DIAGNOSIS — E43 UNSPECIFIED SEVERE PROTEIN-CALORIE MALNUTRITION: ICD-10-CM

## 2021-05-17 DIAGNOSIS — R53.81 OTHER MALAISE: ICD-10-CM

## 2021-05-17 LAB
-  AMIKACIN: SIGNIFICANT CHANGE UP
-  AMOXICILLIN/CLAVULANIC ACID: SIGNIFICANT CHANGE UP
-  AMPICILLIN/SULBACTAM: SIGNIFICANT CHANGE UP
-  AMPICILLIN: SIGNIFICANT CHANGE UP
-  AZTREONAM: SIGNIFICANT CHANGE UP
-  CEFAZOLIN: SIGNIFICANT CHANGE UP
-  CEFEPIME: SIGNIFICANT CHANGE UP
-  CEFOXITIN: SIGNIFICANT CHANGE UP
-  CEFTRIAXONE: SIGNIFICANT CHANGE UP
-  CIPROFLOXACIN: SIGNIFICANT CHANGE UP
-  ERTAPENEM: SIGNIFICANT CHANGE UP
-  GENTAMICIN: SIGNIFICANT CHANGE UP
-  IMIPENEM: SIGNIFICANT CHANGE UP
-  LEVOFLOXACIN: SIGNIFICANT CHANGE UP
-  MEROPENEM: SIGNIFICANT CHANGE UP
-  NITROFURANTOIN: SIGNIFICANT CHANGE UP
-  PIPERACILLIN/TAZOBACTAM: SIGNIFICANT CHANGE UP
-  TIGECYCLINE: SIGNIFICANT CHANGE UP
-  TOBRAMYCIN: SIGNIFICANT CHANGE UP
-  TRIMETHOPRIM/SULFAMETHOXAZOLE: SIGNIFICANT CHANGE UP
CULTURE RESULTS: SIGNIFICANT CHANGE UP
GLUCOSE BLDC GLUCOMTR-MCNC: 119 MG/DL — HIGH (ref 70–99)
GLUCOSE BLDC GLUCOMTR-MCNC: 72 MG/DL — SIGNIFICANT CHANGE UP (ref 70–99)
GLUCOSE BLDC GLUCOMTR-MCNC: 86 MG/DL — SIGNIFICANT CHANGE UP (ref 70–99)
GLUCOSE BLDC GLUCOMTR-MCNC: 91 MG/DL — SIGNIFICANT CHANGE UP (ref 70–99)
METHOD TYPE: SIGNIFICANT CHANGE UP
ORGANISM # SPEC MICROSCOPIC CNT: SIGNIFICANT CHANGE UP
ORGANISM # SPEC MICROSCOPIC CNT: SIGNIFICANT CHANGE UP
SPECIMEN SOURCE: SIGNIFICANT CHANGE UP
T PALLIDUM AB TITR SER: NEGATIVE — SIGNIFICANT CHANGE UP

## 2021-05-17 PROCEDURE — 99232 SBSQ HOSP IP/OBS MODERATE 35: CPT | Mod: GC

## 2021-05-17 PROCEDURE — 99223 1ST HOSP IP/OBS HIGH 75: CPT

## 2021-05-17 RX ORDER — SODIUM CHLORIDE 9 MG/ML
1000 INJECTION, SOLUTION INTRAVENOUS
Refills: 0 | Status: DISCONTINUED | OUTPATIENT
Start: 2021-05-17 | End: 2021-05-21

## 2021-05-17 RX ORDER — INSULIN GLARGINE 100 [IU]/ML
12 INJECTION, SOLUTION SUBCUTANEOUS AT BEDTIME
Refills: 0 | Status: DISCONTINUED | OUTPATIENT
Start: 2021-05-17 | End: 2021-05-18

## 2021-05-17 RX ORDER — SODIUM CHLORIDE 9 MG/ML
500 INJECTION INTRAMUSCULAR; INTRAVENOUS; SUBCUTANEOUS ONCE
Refills: 0 | Status: COMPLETED | OUTPATIENT
Start: 2021-05-17 | End: 2021-05-17

## 2021-05-17 RX ORDER — ACETAMINOPHEN 500 MG
650 TABLET ORAL EVERY 6 HOURS
Refills: 0 | Status: DISCONTINUED | OUTPATIENT
Start: 2021-05-17 | End: 2021-05-19

## 2021-05-17 RX ORDER — CEFTRIAXONE 500 MG/1
1000 INJECTION, POWDER, FOR SOLUTION INTRAMUSCULAR; INTRAVENOUS EVERY 24 HOURS
Refills: 0 | Status: DISCONTINUED | OUTPATIENT
Start: 2021-05-17 | End: 2021-05-17

## 2021-05-17 RX ORDER — CEFEPIME 1 G/1
500 INJECTION, POWDER, FOR SOLUTION INTRAMUSCULAR; INTRAVENOUS EVERY 12 HOURS
Refills: 0 | Status: DISCONTINUED | OUTPATIENT
Start: 2021-05-17 | End: 2021-05-17

## 2021-05-17 RX ORDER — CEFTRIAXONE 500 MG/1
1000 INJECTION, POWDER, FOR SOLUTION INTRAMUSCULAR; INTRAVENOUS EVERY 24 HOURS
Refills: 0 | Status: DISCONTINUED | OUTPATIENT
Start: 2021-05-18 | End: 2021-05-19

## 2021-05-17 RX ADMIN — Medication 650 MILLIGRAM(S): at 12:15

## 2021-05-17 RX ADMIN — CEFTRIAXONE 100 MILLIGRAM(S): 500 INJECTION, POWDER, FOR SOLUTION INTRAMUSCULAR; INTRAVENOUS at 05:55

## 2021-05-17 RX ADMIN — ENOXAPARIN SODIUM 40 MILLIGRAM(S): 100 INJECTION SUBCUTANEOUS at 11:57

## 2021-05-17 RX ADMIN — Medication 650 MILLIGRAM(S): at 11:57

## 2021-05-17 RX ADMIN — Medication 81 MILLIGRAM(S): at 11:57

## 2021-05-17 RX ADMIN — SODIUM CHLORIDE 90 MILLILITER(S): 9 INJECTION, SOLUTION INTRAVENOUS at 22:10

## 2021-05-17 RX ADMIN — INSULIN GLARGINE 12 UNIT(S): 100 INJECTION, SOLUTION SUBCUTANEOUS at 22:08

## 2021-05-17 RX ADMIN — SODIUM CHLORIDE 500 MILLILITER(S): 9 INJECTION INTRAMUSCULAR; INTRAVENOUS; SUBCUTANEOUS at 08:45

## 2021-05-17 RX ADMIN — SODIUM CHLORIDE 90 MILLILITER(S): 9 INJECTION, SOLUTION INTRAVENOUS at 11:58

## 2021-05-17 RX ADMIN — ATORVASTATIN CALCIUM 40 MILLIGRAM(S): 80 TABLET, FILM COATED ORAL at 22:08

## 2021-05-17 NOTE — SWALLOW BEDSIDE ASSESSMENT ADULT - CONSISTENCIES ADMINISTERED
Ice chip x2 applesauce x5 tsp/puree applesauce + tuan cracker x4 tsp/mech soft water x1.5 oz/nectar thick water x8 oz/thin liquid

## 2021-05-17 NOTE — PROGRESS NOTE ADULT - PROBLEM SELECTOR PLAN 3
-PMH DM, no home meds per daughter 2/2 "insurance issues"  -adm   -On HSS q6 while NPO  - A1C is 14.9  -Once diet started, diabetic/DASH  -primary team to consult endo in am  -fu SW consult for insurance issues -PMH DM, no home meds per daughter 2/2 "insurance issues"  -adm   -On HSS q6 while NPO  - Inc lantus to 12 units   - A1C is 14.9  -Once diet started, diabetic/DASH  -Dr. Solano consulted   -rajesh MELARA consult for insurance issues

## 2021-05-17 NOTE — CONSULT NOTE ADULT - PROBLEM SELECTOR RECOMMENDATION 4
Called  pt's family with both number on record.  No answer, left call back information.  Palliative care will follow.

## 2021-05-17 NOTE — SWALLOW BEDSIDE ASSESSMENT ADULT - SWALLOW EVAL: DIAGNOSIS
Pt p/w oral dysphagia c/b pooling of bolus, anterior spillage, reduced bolus control, decreased a-p transport, trace lingual stasis & mild base of tongue pumping. No overt s/s penetration/aspiration at this exam.

## 2021-05-17 NOTE — PROGRESS NOTE ADULT - SUBJECTIVE AND OBJECTIVE BOX
PGY-1 Progress Note discussed with attending    PAGER #: [-----] TILL 5:00 PM  PLEASE CONTACT ON CALL TEAM:  - On Call Team (Please refer to Dieter) FROM 5:00 PM - 8:30PM  - Nightfloat Team FROM 8:30 -7:30 A    INTERVAL HPI/OVERNIGHT EVENTS: No acute events overnight. Patient is laying down comfortably on bed. No new complains except for mild pain in her left lower extrimities. Patient is pending for MRI head and EEG.    MEDICATIONS:  acetaminophen   Tablet .. 650 milliGRAM(s) Oral every 6 hours PRN  aspirin enteric coated 81 milliGRAM(s) Oral daily  atorvastatin 40 milliGRAM(s) Oral at bedtime  dextrose 5% + sodium chloride 0.45%. 1000 milliLiter(s) IV Continuous <Continuous>  enoxaparin Injectable 40 milliGRAM(s) SubCutaneous daily  ergocalciferol 76877 Unit(s) Oral <User Schedule>  insulin glargine Injectable (LANTUS) 12 Unit(s) SubCutaneous at bedtime  insulin lispro (ADMELOG) corrective regimen sliding scale   SubCutaneous three times a day before meals  lisinopril 2.5 milliGRAM(s) Oral daily      REVIEW OF SYSTEMS:  CONSTITUTIONAL: No fever, weight loss, or fatigue  RESPIRATORY: No cough, wheezing, chills or hemoptysis; No shortness of breath  CARDIOVASCULAR: No chest pain, palpitations, dizziness, or leg swelling  GASTROINTESTINAL: No abdominal pain. No nausea, vomiting, or hematemesis; No diarrhea or constipation. No melena or hematochezia.  GENITOURINARY: No dysuria or hematuria, urinary frequency  NEUROLOGICAL: No headaches, memory loss, loss of strength, numbness, or tremors  SKIN: No itching, burning, rashes, or lesions     Vital Signs Last 24 Hrs  T(C): 36.3 (17 May 2021 04:20), Max: 36.7 (16 May 2021 18:33)  T(F): 97.3 (17 May 2021 04:20), Max: 98 (16 May 2021 18:33)  HR: 62 (17 May 2021 08:34) (62 - 97)  BP: 106/58 (17 May 2021 08:34) (97/50 - 157/67)  BP(mean): 70 (17 May 2021 08:34) (70 - 70)  RR: 17 (17 May 2021 04:20) (17 - 18)  SpO2: 99% (17 May 2021 04:20) (95% - 99%)    PHYSICAL EXAMINATION:  GENERAL: sedated  HEENT: NCAT  RESP: Dec breathing in lower lobed bilaterally.  CV: RRR  GI: soft, ND  MSK: atraumatic   EXT: no edema noted   NEURO: sedated, Left side weakness  PSYCH: unable to assess  SKIN: no lesions noted, intact,                         13.3   6.77  )-----------( 241      ( 16 May 2021 11:20 )             39.5     05-16    141  |  107  |  14  ----------------------------<  119<H>  3.6   |  26  |  0.79    Ca    8.8      16 May 2021 11:20    TPro  6.8  /  Alb  2.6<L>  /  TBili  1.2  /  DBili  x   /  AST  34  /  ALT  37  /  AlkPhos  176<H>  05-16    LIVER FUNCTIONS - ( 16 May 2021 11:20 )  Alb: 2.6 g/dL / Pro: 6.8 g/dL / ALK PHOS: 176 U/L / ALT: 37 U/L DA / AST: 34 U/L / GGT: x                   CAPILLARY BLOOD GLUCOSE      RADIOLOGY & ADDITIONAL TESTS:                   PGY-1 Progress Note discussed with attending    PAGER #: [-----] TILL 5:00 PM  PLEASE CONTACT ON CALL TEAM:  - On Call Team (Please refer to Dieter) FROM 5:00 PM - 8:30PM  - Nightfloat Team FROM 8:30 -7:30 A    INTERVAL HPI/OVERNIGHT EVENTS: No acute events overnight. Patient is laying down comfortably on bed. No new complains except for mild pain in her left lower extrimities. Patient is pending for MRI head and EEG. Patient was hypotensive; IV bolus 500 0.9% NS was given. IV fluids rate up to 90cc/hr    MEDICATIONS:  acetaminophen   Tablet .. 650 milliGRAM(s) Oral every 6 hours PRN  aspirin enteric coated 81 milliGRAM(s) Oral daily  atorvastatin 40 milliGRAM(s) Oral at bedtime  dextrose 5% + sodium chloride 0.45%. 1000 milliLiter(s) IV Continuous <Continuous>  enoxaparin Injectable 40 milliGRAM(s) SubCutaneous daily  ergocalciferol 99356 Unit(s) Oral <User Schedule>  insulin glargine Injectable (LANTUS) 12 Unit(s) SubCutaneous at bedtime  insulin lispro (ADMELOG) corrective regimen sliding scale   SubCutaneous three times a day before meals  lisinopril 2.5 milliGRAM(s) Oral daily      REVIEW OF SYSTEMS:  CONSTITUTIONAL: No fever, weight loss, or fatigue  RESPIRATORY: No cough, wheezing, chills or hemoptysis; No shortness of breath  CARDIOVASCULAR: No chest pain, palpitations, dizziness, or leg swelling  GASTROINTESTINAL: No abdominal pain. No nausea, vomiting, or hematemesis; No diarrhea or constipation. No melena or hematochezia.  GENITOURINARY: No dysuria or hematuria, urinary frequency  NEUROLOGICAL: No headaches, memory loss, loss of strength, numbness, or tremors  SKIN: No itching, burning, rashes, or lesions     Vital Signs Last 24 Hrs  T(C): 36.3 (17 May 2021 04:20), Max: 36.7 (16 May 2021 18:33)  T(F): 97.3 (17 May 2021 04:20), Max: 98 (16 May 2021 18:33)  HR: 62 (17 May 2021 08:34) (62 - 97)  BP: 106/58 (17 May 2021 08:34) (97/50 - 157/67)  BP(mean): 70 (17 May 2021 08:34) (70 - 70)  RR: 17 (17 May 2021 04:20) (17 - 18)  SpO2: 99% (17 May 2021 04:20) (95% - 99%)    PHYSICAL EXAMINATION:  GENERAL: sedated  HEENT: NCAT  RESP: Dec breathing in lower lobed bilaterally.  CV: RRR  GI: soft, ND  MSK: atraumatic   EXT: no edema noted   NEURO: sedated, Left side weakness  PSYCH: unable to assess  SKIN: no lesions noted, intact,                         13.3   6.77  )-----------( 241      ( 16 May 2021 11:20 )             39.5     05-16    141  |  107  |  14  ----------------------------<  119<H>  3.6   |  26  |  0.79    Ca    8.8      16 May 2021 11:20    TPro  6.8  /  Alb  2.6<L>  /  TBili  1.2  /  DBili  x   /  AST  34  /  ALT  37  /  AlkPhos  176<H>  05-16    LIVER FUNCTIONS - ( 16 May 2021 11:20 )  Alb: 2.6 g/dL / Pro: 6.8 g/dL / ALK PHOS: 176 U/L / ALT: 37 U/L DA / AST: 34 U/L / GGT: x                   CAPILLARY BLOOD GLUCOSE      RADIOLOGY & ADDITIONAL TESTS:

## 2021-05-17 NOTE — CONSULT NOTE ADULT - SUBJECTIVE AND OBJECTIVE BOX
Patient is a 80y old  Female who presents with a chief complaint of left-sided weakness (14 May 2021 23:48)      HPI:  80F from home with daughters[facility] ambulates only with assistance with PMH DM, HTN, CVA  (residual l-sided weakness), COVID , and Alzheimer dementia (baseline AAOx0-2), sent by PCP Dr. Gilmore  for nonspecific reports of "feeling poorly", cognitive decline, and x2 days questionably worsening l-sided weakness. In ED, patient agitated, confused, disoriented, given 10mg Haldol IV and 2mg Ativan IV, unable to provide history, obtained from daughter Leeanne.     In ED, BP mildly elevated 143/83, VS otherwise wnl. Adm Na 133, , , +UA. COVID negative. EKG NSR with incomplete RBBB. CXR mild b/l infiltrates. CTH with chronic l frontal lobe and r inferior cerebellar lacunar infarcts, no acute findings. Admitted to [facility] for workup of worsening mental status.  Found to have uncont dm.      (14 May 2021 23:48)      PAST MEDICAL & SURGICAL HISTORY:  HTN (hypertension)    DM (diabetes mellitus)    Cerebrovascular accident (CVA)  2019, residual l-sided weakness    Alzheimer&#x27;s dementia    History of 2019 novel coronavirus disease (COVID-19)           MEDICATIONS  (STANDING):  aspirin enteric coated 81 milliGRAM(s) Oral daily  atorvastatin 40 milliGRAM(s) Oral at bedtime  cefTRIAXone   IVPB 1000 milliGRAM(s) IV Intermittent every 24 hours  enoxaparin Injectable 40 milliGRAM(s) SubCutaneous daily  insulin lispro (ADMELOG) corrective regimen sliding scale   SubCutaneous three times a day before meals  lisinopril 2.5 milliGRAM(s) Oral daily    MEDICATIONS  (PRN):      FAMILY HISTORY:      SOCIAL HISTORY:      REVIEW OF SYSTEMS: unable to obtain  	        Vital Signs Last 24 Hrs  T(C): 36.3 (15 May 2021 10:58), Max: 36.4 (15 May 2021 00:04)  T(F): 97.3 (15 May 2021 10:58), Max: 97.6 (15 May 2021 00:04)  HR: 68 (15 May 2021 10:58) (58 - 77)  BP: 167/91 (15 May 2021 10:58) (115/73 - 167/91)  BP(mean): --  RR: 18 (15 May 2021 10:58) (18 - 20)  SpO2: 96% (15 May 2021 10:58) (96% - 100%)      Constitutional:    HEENT: nad    Neck:  No JVD, bruits or thyromegaly    Respiratory:  Clear without rales or rhonchi    Cardiovascular:  RR without murmur, rub or gallop.    Gastrointestinal: Soft without hepatosplenomegaly.    Extremities: without cyanosis, clubbing or edema.    Neurological:  Oriented   x   1   . No gross sensory or motor defects.        LABS:                        12.5   6.70  )-----------( 198      ( 15 May 2021 07:11 )             36.7     05-15    140  |  106  |  16  ----------------------------<  243<H>  3.8   |  28  |  0.74    Ca    8.4      15 May 2021 07:11  Phos  3.7     05-15  Mg     1.9     05-15    TPro  5.8<L>  /  Alb  2.3<L>  /  TBili  0.8  /  DBili  x   /  AST  16  /  ALT  20  /  AlkPhos  123<H>  05-15          Urinalysis Basic - ( 14 May 2021 22:27 )    Color: Yellow / Appearance: Slightly Turbid / S.015 / pH: x  Gluc: x / Ketone: Negative  / Bili: Negative / Urobili: Negative   Blood: x / Protein: 100 / Nitrite: Negative   Leuk Esterase: Small / RBC: 2-5 /HPF / WBC 11-25 /HPF   Sq Epi: x / Non Sq Epi: Few /HPF / Bacteria: Many /HPF            CAPILLARY BLOOD GLUCOSE      POCT Blood Glucose.: 228 mg/dL (15 May 2021 11:12)  POCT Blood Glucose.: 252 mg/dL (15 May 2021 07:41)      RADIOLOGY & ADDITIONAL STUDIES:
HPI:  80F from home with daughters, arrived from [facility] 2/21, ambulates only with assistance with PMH DM, HTN, CVA 2019 (residual l-sided weakness), COVID 11/20, and Alzheimer dementia (baseline AAOx0-2), sent by PCP Dr. Gilmore 5/14 for nonspecific reports of "feeling poorly", cognitive decline, and x2 days questionably worsening l-sided weakness. In ED, patient agitated, confused, disoriented, given 10mg Haldol IV and 2mg Ativan IV, unable to provide history, obtained from jacinda Fallon.       Interval hx: Pt seen and examined at the bedside, AOX1. Incomprehensible speech.   # [id number] facilitate exam. CTH with chronic l frontal lobe and r inferior cerebellar lacunar infarcts.       PAST MEDICAL & SURGICAL HISTORY:  HTN (hypertension)    DM (diabetes mellitus)    Cerebrovascular accident (CVA)  2019, residual l-sided weakness    Alzheimer&#x27;s dementia    History of 2019 novel coronavirus disease (COVID-19)        SOCIAL HISTORY:    Admitted from: Home  Substance abuse history:              Tobacco hx:                  Alcohol hx:              Home Opioid hx:  Orthodoxy:                                    Preferred Language:    Surrogate/HCP/Guardian: Matthew Fallon          Phone#: [phone]    FAMILY HISTORY:  Pt is unable to paticipate  Baseline ADLs (prior to admission): unknown    Allergies    No Known Allergies    Intolerances      Present Symptoms:   Unable to obtain due to poor mentation]    MEDICATIONS  (STANDING):  aspirin enteric coated 81 milliGRAM(s) Oral daily  atorvastatin 40 milliGRAM(s) Oral at bedtime  dextrose 5% + sodium chloride 0.45%. 1000 milliLiter(s) (90 mL/Hr) IV Continuous <Continuous>  enoxaparin Injectable 40 milliGRAM(s) SubCutaneous daily  ergocalciferol 04035 Unit(s) Oral <User Schedule>  insulin glargine Injectable (LANTUS) 12 Unit(s) SubCutaneous at bedtime  insulin lispro (ADMELOG) corrective regimen sliding scale   SubCutaneous three times a day before meals  lisinopril 2.5 milliGRAM(s) Oral daily    MEDICATIONS  (PRN):      PHYSICAL EXAM:    Vital Signs Last 24 Hrs  T(C): 36.3 (17 May 2021 04:20), Max: 36.7 (16 May 2021 18:33)  T(F): 97.3 (17 May 2021 04:20), Max: 98 (16 May 2021 18:33)  HR: 62 (17 May 2021 08:34) (62 - 97)  BP: 106/58 (17 May 2021 08:34) (97/50 - 157/67)  BP(mean): 70 (17 May 2021 08:34) (70 - 70)  RR: 17 (17 May 2021 04:20) (17 - 18)  SpO2: 99% (17 May 2021 04:20) (95% - 99%)    General: Elderly woman, AOx1.  NAD  Karnofsky Performance Score/Palliative Performance Status Version2:  40   %    HEENT: Atraumatic, clear orophatynx  Lungs: unlabored on RA  CV: RRR  GI: soft, non distended  : incontinent  Musculoskeletal: no edema  Skin: no rash or lesions noted  Neuro: unable to follow commands  Oral intake ability: unable/only mouth care  Diet: [NPO]    LABS:                        13.3   6.77  )-----------( 241      ( 16 May 2021 11:20 )             39.5     05-16    141  |  107  |  14  ----------------------------<  119<H>  3.6   |  26  |  0.79    Ca    8.8      16 May 2021 11:20    TPro  6.8  /  Alb  2.6<L>  /  TBili  1.2  /  DBili  x   /  AST  34  /  ALT  37  /  AlkPhos  176<H>  05-16      < from: CT Head No Cont (05.14.21 @ 21:31) >  EXAM:  CT BRAIN                            PROCEDURE DATE:  05/14/2021          INTERPRETATION:  CLINICAL INFORMATION: Left-sided weakness    TECHNIQUE: Noncontrast axial CT images of the brain were acquired from the base of skull to vertex.    COMPARISON: None.    FINDINGS: No intracranial hemorrhage is seen. No evidence for acute territorial infarct. Encephalomalacia/gliosis in the left frontal lobe. Chronic right inferior cerebellar lacunar infarct.    Mild periventricular and subcortical white matter hypoattenuation without mass effect is noted, non-specific, but likely related to chronic small vessel ischemic changes.    Cerebral volume loss is present with proportional prominence of the sulci and ventricles. No mass effect or midline shift is seen. Basal cisterns are not effaced.    The visualized paranasal sinuses and tympanomastoid cavities are clear.    No osseous abnormality seen.    IMPRESSION: No intracranial hemorrhage or mass effect. Encephalomalacia/gliosis in the left frontal lobe, likely due to prior infarct. Chronic right inferior cerebellar lacunar infarct.    If there is continued concern for acute infarct recommend MRI of the brain if there are no contraindications.    < end of copied text >    RADIOLOGY & ADDITIONAL STUDIES: Reviewed    ADVANCE DIRECTIVES: FULL CODE  
MRN-[id number]  Patient is a 80y old  Female who presents with a chief complaint of left-sided weakness (16 May 2021 09:29)    HPI:  80F from home with daughters, arrived from [facility] , ambulates only with assistance with PMH DM, HTN, CVA  (residual l-sided weakness), COVID , and Alzheimer dementia (baseline AAOx0-2), sent by PCP Dr. Gilmore  for nonspecific reports of "feeling poorly", cognitive decline, and x2 days questionably worsening l-sided weakness. In ED, patient agitated, confused, disoriented, given 10mg Haldol IV and 2mg Ativan IV, unable to provide history, obtained from daughter Leeanne.     In ED, BP mildly elevated 143/83, VS otherwise wnl. Adm Na 133, , , +UA. COVID negative. EKG NSR with incomplete RBBB. CXR mild b/l infiltrates. CTH with chronic l frontal lobe and r inferior cerebellar lacunar infarcts, no acute findings. Admitted to [facility] for workup of worsening mental status.       (14 May 2021 23:48)      PAST MEDICAL & SURGICAL HISTORY:  HTN (hypertension)    DM (diabetes mellitus)    Cerebrovascular accident (CVA)  2019, residual l-sided weakness    Alzheimer&#x27;s dementia    History of 2019 novel coronavirus disease (COVID-19)      FAMILY HISTORY:    Social Hx:  Nonsmoker, no drug or alcohol use    Home Medications:    MEDICATIONS  (STANDING):  aspirin enteric coated 81 milliGRAM(s) Oral daily  atorvastatin 40 milliGRAM(s) Oral at bedtime  cefTRIAXone   IVPB 1000 milliGRAM(s) IV Intermittent every 24 hours  dextrose 5% + sodium chloride 0.45%. 1000 milliLiter(s) (60 mL/Hr) IV Continuous <Continuous>  enoxaparin Injectable 40 milliGRAM(s) SubCutaneous daily  ergocalciferol 62894 Unit(s) Oral <User Schedule>  insulin glargine Injectable (LANTUS) 15 Unit(s) SubCutaneous at bedtime  insulin lispro (ADMELOG) corrective regimen sliding scale   SubCutaneous three times a day before meals  lisinopril 2.5 milliGRAM(s) Oral daily    MEDICATIONS  (PRN):    Allergies  No Known Allergies    ROS: Pertinent positives in HPI, all other ROS were reviewed and are negative.      Vital Signs Last 24 Hrs  T(C): 36.4 (16 May 2021 04:30), Max: 37.4 (15 May 2021 18:27)  T(F): 97.5 (16 May 2021 04:30), Max: 99.4 (15 May 2021 18:27)  HR: 80 (16 May 2021 04:30) (68 - 104)  BP: 136/61 (16 May 2021 04:30) (113/76 - 167/91)  BP(mean): --  RR: 18 (16 May 2021 04:30) (17 - 18)  SpO2: 97% (16 May 2021 04:30) (95% - 99%)    GENERAL EXAM:  Constitutional: awake and alert. NAD  HEENT: PERRLA, EOMI; no dentition  Neck: Supple  Respiratory: Breath sounds are clear bilaterally  Gastrointestinal: soft, nontender  Extremities: no edema, no cyanosis  Musculoskeletal: no joint swelling/tenderness, no abnormal movements  Skin: no rashes    NEUROLOGICAL EXAM:  MS: HARVEY, says she is in [place] (her small town), fluent in [place], attends b/l. No apparent hallucinations, ma be delusional, with false recognition/disorientation.   CN: VFF, EOMI, PERRL, no RONAN, no APD,  V1-3 intact, no facial asymmetry, t/p midline, SCM/trap intact; dysarthria, likely from no dentition  Eyes-Fundi: no papilledema.  Motor: Strength: 5/5 4x. Tone: normal. Bulk: normal. DTR 2+ symm.  Plantar flex b/l. Sensation: intact to LT/PP/Vibration/Position/Temperature 4x.   Coordination: intact 4x.   Gait: deferred, pt bedbound; can sit well with good trunk control.    NIHSS: 2 (months and age)  mRS: ?    Labs:   cbc                      12.5   6.70  )-----------( 198      ( 15 May 2021 07:11 )             36.7     [id number]    140  |  106  |  16  ----------------------------<  243<H>  3.8   |  28  |  0.74    Ca    8.4      15 May 2021 07:11  Phos  3.7     05-15  Mg     1.9     05-15    TPro  5.8<L>  /  Alb  2.3<L>  /  TBili  0.8  /  DBili  x   /  AST  16  /  ALT  20  /  AlkPhos  123<H>  05-15    Coags  Lipids05-15 Chol 192 LDL -- HDL 37<L> Trig 173<H>  A1C  CardiacMarkers    LFTsLIVER FUNCTIONS - ( 15 May 2021 07:11 )  Alb: 2.3 g/dL / Pro: 5.8 g/dL / ALK PHOS: 123 U/L / ALT: 20 U/L DA / AST: 16 U/L / GGT: x           UAUrinalysis Basic - ( 14 May 2021 22:27 )    Color: Yellow / Appearance: Slightly Turbid / S.015 / pH: x  Gluc: x / Ketone: Negative  / Bili: Negative / Urobili: Negative   Blood: x / Protein: 100 / Nitrite: Negative   Leuk Esterase: Small / RBC: 2-5 /HPF / WBC 11-25 /HPF   Sq Epi: x / Non Sq Epi: Few /HPF / Bacteria: Many /HPF    < from: CT Head No Cont (21 @ 21:31) >    EXAM:  CT BRAIN                            PROCEDURE DATE:  2021          INTERPRETATION:  CLINICAL INFORMATION: Left-sided weakness    TECHNIQUE: Noncontrast axial CT images of the brain were acquired from the base of skull to vertex.    COMPARISON: None.    FINDINGS: No intracranial hemorrhage is seen. No evidence for acute territorial infarct. Encephalomalacia/gliosis in the left frontal lobe. Chronic right inferior cerebellar lacunar infarct.    Mild periventricular and subcortical white matter hypoattenuation without mass effect is noted, non-specific, but likely related to chronic small vessel ischemic changes.    Cerebral volume loss is present with proportional prominence of the sulci and ventricles. No mass effect or midline shift is seen. Basal cisterns are not effaced.    The visualized paranasal sinuses and tympanomastoid cavities are clear.    No osseous abnormality seen.    IMPRESSION: No intracranial hemorrhage or mass effect. Encephalomalacia/gliosis in the left frontal lobe, likely due to prior infarct. Chronic right inferior cerebellar lacunar infarct.    If there is continued concern for acute infarct recommend MRI of the brain if there are no contraindications.              DONTAE GUZMAN MD; Attending Radiologist  This document has been electronically signed. May 14 2021 10:04PM    < end of copied text >

## 2021-05-17 NOTE — DIETITIAN INITIAL EVALUATION ADULT. - FACTORS AFF FOOD INTAKE
acute on chronic comorbidities/change in mental status/[place]/ethnic/cultural/personal food preferences

## 2021-05-17 NOTE — CONSULT NOTE ADULT - PROBLEM SELECTOR RECOMMENDATION 3
Pt is bedbound. Dependent.  With hypoalbuminemia.  High risk for skin failure.  Skin care per protocol.     PT eval
? multifactorial   hx of Alzheimer's superimposed with acute illness  w/u per prim team

## 2021-05-17 NOTE — CONSULT NOTE ADULT - PROBLEM SELECTOR RECOMMENDATION 9
HX of CVA with residual Lt sided weakness. Pt is AOx1.  Bedbound.  Dependent.  FAST 7 c.      Called  pt's family with both numbers on record.  No answer, left call back information.  Palliative care will follow.      CTH with chronic l frontal lobe and r inferior cerebellar lacunar infarcts. HX of CVA with residual Lt sided weakness. Pt is AOx1.  Bedbound.  Dependent.  FAST 7 c. Hospice eligible.     Called  pt's family with both numbers on record.  No answer, left call back information.  Palliative care will follow.      CTH with chronic l frontal lobe and r inferior cerebellar lacunar infarcts.

## 2021-05-17 NOTE — PROGRESS NOTE ADULT - PROBLEM SELECTOR PLAN 1
- Adm +UA, patient unable to respond regarding symptoms.  - Urine culture is positive for Klebsiella; Pending sensitivity.   - Continue on Rocephin for 4 more days.

## 2021-05-17 NOTE — DIETITIAN INITIAL EVALUATION ADULT. - PERTINENT LABORATORY DATA
05-16 Na141 mmol/L Glu 119 mg/dL<H> K+ 3.6 mmol/L Cr  0.79 mg/dL BUN 14 mg/dL   05-15 Phos 3.7 mg/dL   05-16 Alb 2.6 g/dL<L>       05-15 Chol 192 mg/dL LDL --    HDL 37 mg/dL<L> Trig 173 mg/dL<H>  05-15-21 @ 10:35 HgbA1C 14.9 [4.0 - 5.6]

## 2021-05-17 NOTE — PROGRESS NOTE ADULT - PROBLEM SELECTOR PLAN 2
sent by PCP Dr. Gilmore 5/14 for nonspecific reports of "feeling poorly", cognitive decline, and x2 days questionably worsening l-sided weakness   -in ED, afebrile, BP mildly elevated, VS otherwise wnl  -adm Na 133, , monitor routine daily labs  -ddx progression of Alzheimer's dz vs CVA/TIA vs infectious (see below section)   -s/p Haldol 10mg IV and Ativan 2mg IV 2/2 agitation in ED  -EKG NSR with incomplete RBBB  -CTH with chronic l frontal lobe and r inferior cerebellar lacunar infarcts, no acute findings  -will hold off further evaluation with repeat CTH, CTA H/N or MRI as clinical picture does not fit acute neuro event  -On asa, statin, and lisinopril for secondary prevention   -NPO until SS eval (except meds, ice chips); Speech and swallow pending.  -fu PT recs  -SW consulted for placement   - Patient is pending for MRI and EEG  - Neurology Dr. Kim

## 2021-05-17 NOTE — DIETITIAN INITIAL EVALUATION ADULT. - SIGNS/SYMPTOMS
XtbA4O=34.9, Finger stick range=91 to 252, Xxu=664-->119 NPO x 3 to 4d in-house, pending swallow evaluation, limited intake/wt history data at present

## 2021-05-17 NOTE — SWALLOW BEDSIDE ASSESSMENT ADULT - SWALLOW EVAL: RECOMMENDED FEEDING/EATING TECHNIQUES
allow for swallow between intakes/maintain upright posture during/after eating for 30 mins/oral hygiene/position upright (90 degrees)

## 2021-05-17 NOTE — SWALLOW BEDSIDE ASSESSMENT ADULT - ORAL PHASE
Decreased anterior-posterior movement of the bolus base of tongue pumping/Decreased anterior-posterior movement of the bolus/Lingual stasis Decreased anterior-posterior movement of the bolus/Lingual stasis

## 2021-05-17 NOTE — DIETITIAN INITIAL EVALUATION ADULT. - PERTINENT MEDS FT
MEDICATIONS  (STANDING):  aspirin enteric coated 81 milliGRAM(s) Oral daily  atorvastatin 40 milliGRAM(s) Oral at bedtime  cefepime   IVPB 500 milliGRAM(s) IV Intermittent every 12 hours  dextrose 5% + sodium chloride 0.45%. 1000 milliLiter(s) (90 mL/Hr) IV Continuous <Continuous>  enoxaparin Injectable 40 milliGRAM(s) SubCutaneous daily  ergocalciferol 45193 Unit(s) Oral <User Schedule>  insulin glargine Injectable (LANTUS) 15 Unit(s) SubCutaneous at bedtime  insulin lispro (ADMELOG) corrective regimen sliding scale   SubCutaneous three times a day before meals  lisinopril 2.5 milliGRAM(s) Oral daily

## 2021-05-17 NOTE — SWALLOW BEDSIDE ASSESSMENT ADULT - SLP PERTINENT HISTORY OF CURRENT PROBLEM
80F, from home, lives w/ daughters, arrived from [facility] 2/21, ambulates only w/ assistance. PMHx DM, HTN, CVA 2019 (residual l-sided weakness), COVID 11/20, & Alzheimer's dementia (baseline AAOx0-2). Reportedly sent by PCP Dr. Gilmore 5/14 for nonspecific reports of "feeling poorly", cognitive decline, x2 days questionably worsening l-sided weakness. In ED, pt reportedly agitated, confused, disoriented, given 10mg Haldol IV and 2mg Ativan IV, unable to provide history, obtained from daughter Leeanne. In ED, BP mildly elevated 143/83, VS otherwise WNL. CXR mild b/l infiltrates. CTH chronic l frontal lobe & r inferior cerebellar lacunar infarcts, no acute findings. Admitted to tele for workup of worsening mental status.

## 2021-05-17 NOTE — SWALLOW BEDSIDE ASSESSMENT ADULT - ORAL PREPARATORY PHASE
Decreased mastication ability Within functional limits Reduced bolus control/Anterior loss of bolus Anterior loss of bolus

## 2021-05-17 NOTE — SWALLOW BEDSIDE ASSESSMENT ADULT - ASR SWALLOW REFERRAL
Pt p/w gross & fine motor impairments; generalized weakness of UE's/occupational therapy/physical therapy

## 2021-05-17 NOTE — PROGRESS NOTE ADULT - ASSESSMENT
80F from home with daughters, arrived from [facility] 2/21, ambulates only with assistance with PMH DM, HTN, CVA 2019 (residual l-sided weakness), COVID 11/20, and Alzheimer dementia (baseline AAOx0-2), sent by PCP Dr. Gilmore 5/14 for nonspecific reports of "feeling poorly", cognitive decline, and x2 days questionably worsening weakness, admitted to tele for workup of worsening mental status.

## 2021-05-17 NOTE — PROGRESS NOTE ADULT - SUBJECTIVE AND OBJECTIVE BOX
Interval Events:  pt lethargic    Allergies    No Known Allergies    Intolerances      Endocrine/Metabolic Medications:  atorvastatin 40 milliGRAM(s) Oral at bedtime  insulin glargine Injectable (LANTUS) 15 Unit(s) SubCutaneous at bedtime  insulin lispro (ADMELOG) corrective regimen sliding scale   SubCutaneous three times a day before meals      Vital Signs Last 24 Hrs  T(C): 36.3 (17 May 2021 04:20), Max: 36.7 (16 May 2021 18:33)  T(F): 97.3 (17 May 2021 04:20), Max: 98 (16 May 2021 18:33)  HR: 62 (17 May 2021 08:34) (62 - 97)  BP: 106/58 (17 May 2021 08:34) (97/50 - 157/67)  BP(mean): 70 (17 May 2021 08:34) (70 - 70)  RR: 17 (17 May 2021 04:20) (17 - 18)  SpO2: 99% (17 May 2021 04:20) (95% - 99%)      PHYSICAL EXAM  All physical exam findings normal, except those marked:  General:	Alert, active, cooperative, NAD, well hydrated  .		[] Abnormal:  Neck		Normal: supple, no cervical adenopathy, no palpable thyroid  .		[] Abnormal:  Cardiovascular	Normal: regular rate, normal S1, S2, no murmurs  .		[] Abnormal:  Respiratory	Normal: no chest wall deformity, normal respiratory pattern, CTA B/L  .		[] Abnormal:  Abdominal	Normal: soft, ND, NT, bowel sounds present, no masses, no organomegaly  .		[] Abnormal:  		Normal normal genitalia, testes descended, circumcised/uncircumcised  .		Tiffanie stage:			Breast tiffanie:  .		Menstrual history:  .		[] Abnormal:  Extremities	Normal: FROM x4  .		[] Abnormal:  Skin		Normal: intact and not indurated, no rash, no acanthosis nigricans  .		[] Abnormal:  Neurologic	Normal: grossly intact  .		[] Abnormal:    LABS                        13.3   6.77  )-----------( 241      ( 16 May 2021 11:20 )             39.5                               141    |  107    |  14                  Calcium: 8.8   / iCa: x      (05-16 @ 11:20)    ----------------------------<  119       Magnesium: x                                3.6     |  26     |  0.79             Phosphorous: x        TPro  6.8    /  Alb  2.6    /  TBili  1.2    /  DBili  x      /  AST  34     /  ALT  37     /  AlkPhos  176    16 May 2021 11:20    CAPILLARY BLOOD GLUCOSE      POCT Blood Glucose.: 91 mg/dL (17 May 2021 07:55)  POCT Blood Glucose.: 125 mg/dL (16 May 2021 21:25)  POCT Blood Glucose.: 169 mg/dL (16 May 2021 17:02)  POCT Blood Glucose.: 125 mg/dL (16 May 2021 11:43)        Assesment/plan    80F from home with daughters, arrived from [facility] 2/21, ambulates only with assistance with PMH DM, HTN, CVA 2019 (residual l-sided weakness), COVID 11/20, and Alzheimer dementia (baseline AAOx0-2), sent by PCP Dr. Gilmore 5/14 for nonspecific reports of "feeling poorly", cognitive decline, and x2 days questionably worsening l-sided weakness.  Found to have uncont dm.        Problem/Recommendation - 1:  Problem: Uncontrolled diabetes mellitus. Recommendation: hyperglycemic on no out pt meds  a1c level- 14.9   too tightly controlled   change lantus to 12 units   admelog prn q6  pt remains NPO- await SS eval  fsg q6.     Problem/Recommendation - 2:  ·  Problem: UTI (urinary tract infection).  Recommendation: cont iv abx.      Problem/Recommendation - 3:  ·  Problem: Altered mental status, unspecified altered mental status type.  Recommendation: ? multifactorial   hx of Alzheimer's superimposed with acute illness  w/u per prim team

## 2021-05-17 NOTE — SWALLOW BEDSIDE ASSESSMENT ADULT - COMMENTS
Pt received supine, HOB elevated to 90°. Eval in [place] by SLP. AA+Ox1-2, confused, +aphasia c/b use of neologisms, semantic paraphasias, +bilateral weakness in UEs, +facial drooping, +slurred speech.

## 2021-05-17 NOTE — CONSULT NOTE ADULT - ATTENDING COMMENTS
Elderly F with advanced dementia, hx CVA, min ambulatory, A&Ox1, min verbal, hospice eligible. Poor po intake. SLP eval. Palliative will follow for ongoing GOC discussion.

## 2021-05-17 NOTE — CONSULT NOTE ADULT - TIME BILLING
I have examined the pt at bedside.  The risks and the benefits of the proposed diagnostic/therapeutic approach were thoroughly discussed.
discussed w primary team

## 2021-05-17 NOTE — SWALLOW BEDSIDE ASSESSMENT ADULT - MODE OF PRESENTATION
spoon/fed by clinician self fed self fed/fed by clinician Pt demonstrated difficulty w/ control of bolus on spoon/self fed/fed by clinician

## 2021-05-17 NOTE — DIETITIAN INITIAL EVALUATION ADULT. - OTHER INFO
Pt lives home with family PTA, confused with dementia, on Enhanced Supervision; Limited intake/wt change history data available at present; NPO x 3 to 4d in-house, pending Swallow evaluation, Palliative,  consult; [id number]=69.9, poorly controlled DM, family reports no home meds due to 2/2 "insurance issues" per MD , s/p Endocrinology consult, pt not a candidate for diet education at present

## 2021-05-17 NOTE — DIETITIAN INITIAL EVALUATION ADULT. - ETIOLOGY
acute on chronic comorbidities with altered mental status, h/o CVA, impaired glucose metabolism with uncontrolled DM, non medication adherence (no home DM meds)

## 2021-05-17 NOTE — CONSULT NOTE ADULT - PROBLEM SELECTOR RECOMMENDATION 2
2/2 advanced dementia.  Currently NPO.  Albumin 2.6.  High risk for skin failure.     SLP pool with hypoalbuminemia, poor po intake. SLP eval. Nutrition consult. At risk for skin failure.

## 2021-05-18 LAB
ALBUMIN SERPL ELPH-MCNC: 2.2 G/DL — LOW (ref 3.5–5)
ALP SERPL-CCNC: 135 U/L — HIGH (ref 40–120)
ALT FLD-CCNC: 24 U/L DA — SIGNIFICANT CHANGE UP (ref 10–60)
ANION GAP SERPL CALC-SCNC: 7 MMOL/L — SIGNIFICANT CHANGE UP (ref 5–17)
AST SERPL-CCNC: 23 U/L — SIGNIFICANT CHANGE UP (ref 10–40)
BASOPHILS # BLD AUTO: 0.02 K/UL — SIGNIFICANT CHANGE UP (ref 0–0.2)
BASOPHILS NFR BLD AUTO: 0.4 % — SIGNIFICANT CHANGE UP (ref 0–2)
BILIRUB SERPL-MCNC: 0.7 MG/DL — SIGNIFICANT CHANGE UP (ref 0.2–1.2)
BUN SERPL-MCNC: 10 MG/DL — SIGNIFICANT CHANGE UP (ref 7–18)
CALCIUM SERPL-MCNC: 9 MG/DL — SIGNIFICANT CHANGE UP (ref 8.4–10.5)
CHLORIDE SERPL-SCNC: 113 MMOL/L — HIGH (ref 96–108)
CO2 SERPL-SCNC: 26 MMOL/L — SIGNIFICANT CHANGE UP (ref 22–31)
CREAT SERPL-MCNC: 0.64 MG/DL — SIGNIFICANT CHANGE UP (ref 0.5–1.3)
EOSINOPHIL # BLD AUTO: 0.17 K/UL — SIGNIFICANT CHANGE UP (ref 0–0.5)
EOSINOPHIL NFR BLD AUTO: 3.1 % — SIGNIFICANT CHANGE UP (ref 0–6)
GLUCOSE BLDC GLUCOMTR-MCNC: 152 MG/DL — HIGH (ref 70–99)
GLUCOSE BLDC GLUCOMTR-MCNC: 219 MG/DL — HIGH (ref 70–99)
GLUCOSE BLDC GLUCOMTR-MCNC: 306 MG/DL — HIGH (ref 70–99)
GLUCOSE BLDC GLUCOMTR-MCNC: 63 MG/DL — LOW (ref 70–99)
GLUCOSE BLDC GLUCOMTR-MCNC: 74 MG/DL — SIGNIFICANT CHANGE UP (ref 70–99)
GLUCOSE SERPL-MCNC: 71 MG/DL — SIGNIFICANT CHANGE UP (ref 70–99)
HCT VFR BLD CALC: 35.9 % — SIGNIFICANT CHANGE UP (ref 34.5–45)
HGB BLD-MCNC: 12.1 G/DL — SIGNIFICANT CHANGE UP (ref 11.5–15.5)
IMM GRANULOCYTES NFR BLD AUTO: 0.5 % — SIGNIFICANT CHANGE UP (ref 0–1.5)
LYMPHOCYTES # BLD AUTO: 1.56 K/UL — SIGNIFICANT CHANGE UP (ref 1–3.3)
LYMPHOCYTES # BLD AUTO: 28.5 % — SIGNIFICANT CHANGE UP (ref 13–44)
MAGNESIUM SERPL-MCNC: 2.1 MG/DL — SIGNIFICANT CHANGE UP (ref 1.6–2.6)
MCHC RBC-ENTMCNC: 31.3 PG — SIGNIFICANT CHANGE UP (ref 27–34)
MCHC RBC-ENTMCNC: 33.7 GM/DL — SIGNIFICANT CHANGE UP (ref 32–36)
MCV RBC AUTO: 92.8 FL — SIGNIFICANT CHANGE UP (ref 80–100)
MONOCYTES # BLD AUTO: 0.66 K/UL — SIGNIFICANT CHANGE UP (ref 0–0.9)
MONOCYTES NFR BLD AUTO: 12 % — SIGNIFICANT CHANGE UP (ref 2–14)
NEUTROPHILS # BLD AUTO: 3.04 K/UL — SIGNIFICANT CHANGE UP (ref 1.8–7.4)
NEUTROPHILS NFR BLD AUTO: 55.5 % — SIGNIFICANT CHANGE UP (ref 43–77)
NRBC # BLD: 0 /100 WBCS — SIGNIFICANT CHANGE UP (ref 0–0)
PHOSPHATE SERPL-MCNC: 4 MG/DL — SIGNIFICANT CHANGE UP (ref 2.5–4.5)
PLATELET # BLD AUTO: 239 K/UL — SIGNIFICANT CHANGE UP (ref 150–400)
POTASSIUM SERPL-MCNC: 3.4 MMOL/L — LOW (ref 3.5–5.3)
POTASSIUM SERPL-SCNC: 3.4 MMOL/L — LOW (ref 3.5–5.3)
PROT SERPL-MCNC: 6 G/DL — SIGNIFICANT CHANGE UP (ref 6–8.3)
RBC # BLD: 3.87 M/UL — SIGNIFICANT CHANGE UP (ref 3.8–5.2)
RBC # FLD: 12.8 % — SIGNIFICANT CHANGE UP (ref 10.3–14.5)
SODIUM SERPL-SCNC: 146 MMOL/L — HIGH (ref 135–145)
WBC # BLD: 5.48 K/UL — SIGNIFICANT CHANGE UP (ref 3.8–10.5)
WBC # FLD AUTO: 5.48 K/UL — SIGNIFICANT CHANGE UP (ref 3.8–10.5)

## 2021-05-18 PROCEDURE — 95819 EEG AWAKE AND ASLEEP: CPT | Mod: 26

## 2021-05-18 PROCEDURE — 70551 MRI BRAIN STEM W/O DYE: CPT | Mod: 26

## 2021-05-18 PROCEDURE — 99233 SBSQ HOSP IP/OBS HIGH 50: CPT | Mod: GC

## 2021-05-18 PROCEDURE — 99497 ADVNCD CARE PLAN 30 MIN: CPT

## 2021-05-18 PROCEDURE — 99232 SBSQ HOSP IP/OBS MODERATE 35: CPT

## 2021-05-18 RX ORDER — OLANZAPINE 15 MG/1
2.5 TABLET, FILM COATED ORAL ONCE
Refills: 0 | Status: COMPLETED | OUTPATIENT
Start: 2021-05-18 | End: 2021-05-18

## 2021-05-18 RX ORDER — POTASSIUM CHLORIDE 20 MEQ
10 PACKET (EA) ORAL
Refills: 0 | Status: DISCONTINUED | OUTPATIENT
Start: 2021-05-18 | End: 2021-05-18

## 2021-05-18 RX ORDER — THIAMINE MONONITRATE (VIT B1) 100 MG
100 TABLET ORAL DAILY
Refills: 0 | Status: DISCONTINUED | OUTPATIENT
Start: 2021-05-18 | End: 2021-05-19

## 2021-05-18 RX ORDER — INSULIN GLARGINE 100 [IU]/ML
10 INJECTION, SOLUTION SUBCUTANEOUS AT BEDTIME
Refills: 0 | Status: DISCONTINUED | OUTPATIENT
Start: 2021-05-18 | End: 2021-05-19

## 2021-05-18 RX ORDER — POTASSIUM CHLORIDE 20 MEQ
10 PACKET (EA) ORAL
Refills: 0 | Status: COMPLETED | OUTPATIENT
Start: 2021-05-18 | End: 2021-05-18

## 2021-05-18 RX ADMIN — CEFTRIAXONE 100 MILLIGRAM(S): 500 INJECTION, POWDER, FOR SOLUTION INTRAMUSCULAR; INTRAVENOUS at 05:34

## 2021-05-18 RX ADMIN — Medication 100 MILLIEQUIVALENT(S): at 09:30

## 2021-05-18 RX ADMIN — Medication 100 MILLIEQUIVALENT(S): at 10:30

## 2021-05-18 RX ADMIN — OLANZAPINE 2.5 MILLIGRAM(S): 15 TABLET, FILM COATED ORAL at 02:32

## 2021-05-18 RX ADMIN — Medication 2: at 12:15

## 2021-05-18 RX ADMIN — Medication 81 MILLIGRAM(S): at 12:14

## 2021-05-18 RX ADMIN — INSULIN GLARGINE 10 UNIT(S): 100 INJECTION, SOLUTION SUBCUTANEOUS at 22:20

## 2021-05-18 RX ADMIN — Medication 100 MILLIEQUIVALENT(S): at 08:00

## 2021-05-18 RX ADMIN — Medication 1: at 17:54

## 2021-05-18 RX ADMIN — ENOXAPARIN SODIUM 40 MILLIGRAM(S): 100 INJECTION SUBCUTANEOUS at 12:16

## 2021-05-18 RX ADMIN — Medication 100 MILLIGRAM(S): at 17:53

## 2021-05-18 RX ADMIN — LISINOPRIL 2.5 MILLIGRAM(S): 2.5 TABLET ORAL at 05:34

## 2021-05-18 NOTE — PROGRESS NOTE ADULT - PROBLEM SELECTOR PLAN 1
- Patient was sent to ED due to AMS and worsing decline.  -in ED, afebrile, BP mildly elevated, VS otherwise wnl  -ddx progression of Alzheimer's dz vs CVA/TIA vs infectious (see below section)   -s/p Haldol 10mg IV and Ativan 2mg IV 2/2 agitation in ED  -EKG NSR with incomplete RBBB  -CTH with chronic l frontal lobe and r inferior cerebellar lacunar infarcts, no acute findings  - MRI head done; Pending official report.  - EEG done; Pending official report  - On asa, statin, and lisinopril for secondary prevention   - Speech and swallow eval; [facility]  - SW consulted for placement   - Neurology Dr. Kim

## 2021-05-18 NOTE — PROGRESS NOTE ADULT - ASSESSMENT
80F from home with daughters, arrived from [facility] 2/21, ambulates only with assistance with PMH DM, HTN, CVA 2019 (residual l-sided weakness), COVID 11/20, and Alzheimer dementia (baseline AAOx0-2), sent by PCP Dr. Gilmore 5/14 for nonspecific reports of "feeling poorly", cognitive decline, and x2 days questionably worsening weakness, admitted to tele for workup of worsening mental status.

## 2021-05-18 NOTE — PROGRESS NOTE ADULT - SUBJECTIVE AND OBJECTIVE BOX
Interval Events:  pt in nad    Allergies    No Known Allergies    Intolerances      Endocrine/Metabolic Medications:  atorvastatin 40 milliGRAM(s) Oral at bedtime  insulin glargine Injectable (LANTUS) 10 Unit(s) SubCutaneous at bedtime  insulin lispro (ADMELOG) corrective regimen sliding scale   SubCutaneous three times a day before meals      Vital Signs Last 24 Hrs  T(C): 36.1 (18 May 2021 05:07), Max: 36.4 (17 May 2021 22:15)  T(F): 97 (18 May 2021 05:07), Max: 97.6 (17 May 2021 22:15)  HR: 86 (18 May 2021 05:07) (64 - 90)  BP: 137/69 (18 May 2021 05:07) (126/73 - 173/95)  BP(mean): --  RR: 18 (18 May 2021 05:07) (18 - 18)  SpO2: 99% (18 May 2021 05:07) (98% - 100%)      PHYSICAL EXAM  All physical exam findings normal, except those marked:  General:	Alert, active, cooperative, NAD, well hydrated  .		[] Abnormal:  Neck		Normal: supple, no cervical adenopathy, no palpable thyroid  .		[] Abnormal:  Cardiovascular	Normal: regular rate, normal S1, S2, no murmurs  .		[] Abnormal:  Respiratory	Normal: no chest wall deformity, normal respiratory pattern, CTA B/L  .		[] Abnormal:  Abdominal	Normal: soft, ND, NT, bowel sounds present, no masses, no organomegaly  .		[] Abnormal:  		Normal normal genitalia, testes descended, circumcised/uncircumcised  .		Tiffanie stage:			Breast tiffanie:  .		Menstrual history:  .		[] Abnormal:  Extremities	Normal: FROM x4  .		[] Abnormal:  Skin		Normal: intact and not indurated, no rash, no acanthosis nigricans  .		[] Abnormal:  Neurologic	Normal: grossly intact  .		[] Abnormal:    LABS                        12.1   5.48  )-----------( 239      ( 18 May 2021 06:33 )             35.9                               146    |  113    |  10                  Calcium: 9.0   / iCa: x      (05-18 @ 06:33)    ----------------------------<  71        Magnesium: 2.1                              3.4     |  26     |  0.64             Phosphorous: 4.0      TPro  6.0    /  Alb  2.2    /  TBili  0.7    /  DBili  x      /  AST  23     /  ALT  24     /  AlkPhos  135    18 May 2021 06:33    CAPILLARY BLOOD GLUCOSE      POCT Blood Glucose.: 74 mg/dL (18 May 2021 07:46)  POCT Blood Glucose.: 63 mg/dL (18 May 2021 07:31)  POCT Blood Glucose.: 119 mg/dL (17 May 2021 22:05)  POCT Blood Glucose.: 72 mg/dL (17 May 2021 16:52)  POCT Blood Glucose.: 86 mg/dL (17 May 2021 11:10)        Assesment/plan    80F from home with daughters, arrived from [facility] 2/21, ambulates only with assistance with PMH DM, HTN, CVA 2019 (residual l-sided weakness), COVID 11/20, and Alzheimer dementia (baseline AAOx0-2), sent by PCP Dr. Gilmore 5/14 for nonspecific reports of "feeling poorly", cognitive decline, and x2 days questionably worsening l-sided weakness.  Found to have uncont dm.        Problem/Recommendation - 1:  Problem: Uncontrolled diabetes mellitus. Recommendation: hyperglycemic on no out pt meds  a1c level- 14.9   too tightly controlled - with hypos  hold lantus  - restart at 6 units qhs if fsg >200  admelog prn q6  0n soft diet -poor intake   fsg q6.     Problem/Recommendation - 2:  ·  Problem: UTI (urinary tract infection).  Recommendation: cont iv abx.      Problem/Recommendation - 3:  ·  Problem: Altered mental status, unspecified altered mental status type.  Recommendation: ? multifactorial   hx of Alzheimer's superimposed with acute illness  w/u per prim team

## 2021-05-18 NOTE — EEG REPORT - NS EEG TEXT BOX
[facility] Report of Routine EEG with Video And Report of DigitalCompressed Spectral Array Analysis  [facility]: [address], [address] [place] [address], Phone: [phone] [facility]: [address], [place] [address], Phone: [phone] Office: [address] [place] [address], [place] [address], [place] [address], Phone: [phone]  Patient Name: LORI [place]   Age: 80 year : 1940 MRN #: -, Location: - Referring Physician: -  EEG #: - Study Date: 2021   Start Time: 11:25:26 AM    Study Duration: 24.9  Technical Information:					 On Instrument: - Placement and Labeling of Electrodes: The EEG was performed utilizing 20 channels referential EEG connections (coronal over temporal over parasagittal montage) using all standard 10-20 electrode placements with EKG.  Recording was at a sampling rate of 256 samples per second per channel.  Time synchronized digital video recording was done simultaneously with EEG recording.  A low light infrared camera was used for low light recording.  Beni and seizure detection algorithms were utilized. CSA Technical Component: Quantitative EEG analysis using a separate Compressed Spectral Array (CSA) software package was conducted in real-time and run at bedside after set up by the technician, digitally displaying the power of electrographic frequencies included in the 1-30Hz band using a graded color map.  This data was reviewed and interpreted independently, and is reported in a separate section below.  History:  Routine study Performed bedside Patient awake and confused Dementia HV noit performed and photic performed 79 Y/O female presents with : Dementia Encephalopathy R/O seizure CVA Hypertension Weakness barrier language  Medication No Data.  Study Interpretation:  FINDINGS:  The background was continuous, spontaneously variable and reactive.  A clear posterior dominant rhythm is not present  Background Slowing: Generalized slowing: there is a mix of theta and delta in the background. Focal slowing: There is continuous left > right rhythmical and polymorphic slowing.  Sleep Background: Drowsiness was characterized by fragmentation, attenuation, and slowing of the background activity.   Stage II sleep transients were not recorded.  Epileptiform Activity:  There are probable sharp waves in the left > right temporal region  Events: No clinical events were recorded. No seizures were recorded.  Activation Procedures:  -Hyperventilation was not performed.   -Photic stimulation was not performed.  Artifacts: Intermittent myogenic and movement artifacts were noted.  ECG: The heart rate on single channel ECG was predominantly between 70-80 BPM.  EEG Classification / Summary: Abnormal Routine EEG Study  Probable sharp waves, L>R temporal  Continuous polymorphic and rhythmical slowing, focal, L > R temporal.  Clinical Impression: There is evidence of L>R temporal focal dysfunction and increased irritability and potential for seizures. There is evidence of structural abnormality in the L>R frontotemporal region. There is also evidence of diffuse cerebral dysfunction that is not specific for etiology.   ________________________________________   Juan Antonio Nj MD PhD Director, Epilepsy Division, [facility]

## 2021-05-18 NOTE — PROGRESS NOTE ADULT - PROBLEM SELECTOR PLAN 3
- BS dropped to 63 AM s/p 12 units lantus bedtime  - Will hold Lantus tonight as per Dr. Solano  - Patient will be discharged on home meds - BS dropped to 63 AM s/p 12 units lantus bedtime  - Will hold Lantus tonight as per Dr. Solano but Will restart at 6 units qhs if fsg >200  - Patient will be discharged on home meds

## 2021-05-18 NOTE — PROGRESS NOTE ADULT - SUBJECTIVE AND OBJECTIVE BOX
PGY-1 Progress Note discussed with attending    PAGER #: [-----] TILL 5:00 PM  PLEASE CONTACT ON CALL TEAM:  - On Call Team (Please refer to Dieter) FROM 5:00 PM - 8:30PM  - Nightfloat Team FROM 8:30 -7:30 AM    INTERVAL HPI/OVERNIGHT EVENTS: Patient was agitated overnight. Tried to ripping off tele box and other wires/ lines. BP was 170/90. Failed to reoriented again. Zyprexa 2.5mgh IM was given. Patient had MRI; PENDING FOR OFFICIAL READING    MEDICATIONS:  acetaminophen   Tablet .. 650 milliGRAM(s) Oral every 6 hours PRN  aspirin enteric coated 81 milliGRAM(s) Oral daily  atorvastatin 40 milliGRAM(s) Oral at bedtime  cefTRIAXone   IVPB 1000 milliGRAM(s) IV Intermittent every 24 hours  dextrose 5% + sodium chloride 0.45%. 1000 milliLiter(s) IV Continuous <Continuous>  enoxaparin Injectable 40 milliGRAM(s) SubCutaneous daily  ergocalciferol 77760 Unit(s) Oral <User Schedule>  insulin glargine Injectable (LANTUS) 10 Unit(s) SubCutaneous at bedtime  insulin lispro (ADMELOG) corrective regimen sliding scale   SubCutaneous three times a day before meals  lisinopril 2.5 milliGRAM(s) Oral daily  thiamine 100 milliGRAM(s) Oral daily      REVIEW OF SYSTEMS:  CONSTITUTIONAL: No fever, weight loss, or fatigue  RESPIRATORY: No cough, wheezing, chills or hemoptysis; No shortness of breath  CARDIOVASCULAR: No chest pain, palpitations, dizziness, or leg swelling  GASTROINTESTINAL: No abdominal pain. No nausea, vomiting, or hematemesis; No diarrhea or constipation. No melena or hematochezia.  GENITOURINARY: No dysuria or hematuria, urinary frequency  NEUROLOGICAL: No headaches, memory loss, loss of strength, numbness, or tremors  SKIN: No itching, burning, rashes, or lesions     Vital Signs Last 24 Hrs  T(C): 36.2 (18 May 2021 10:36), Max: 36.4 (17 May 2021 22:15)  T(F): 97.2 (18 May 2021 10:36), Max: 97.6 (17 May 2021 22:15)  HR: 80 (18 May 2021 10:36) (73 - 90)  BP: 139/88 (18 May 2021 10:36) (126/73 - 173/95)  BP(mean): --  RR: 18 (18 May 2021 10:36) (18 - 18)  SpO2: 96% (18 May 2021 10:36) (96% - 100%)    PHYSICAL EXAMINATION:  GENERAL: AAO x1-2, not in acute distress.  HEENT: NCAT  RESP: Dec breathing in lower lobed bilaterally.  CV: RRR  GI: soft, ND  MSK: atraumatic   EXT: no edema noted   NEURO: Left side weakness and hyperreflexia   PSYCH: unable to assess  SKIN: no lesions noted, intact,                           12.1   5.48  )-----------( 239      ( 18 May 2021 06:33 )             35.9     05-18    146<H>  |  113<H>  |  10  ----------------------------<  71  3.4<L>   |  26  |  0.64    Ca    9.0      18 May 2021 06:33  Phos  4.0     05-18  Mg     2.1     05-18    TPro  6.0  /  Alb  2.2<L>  /  TBili  0.7  /  DBili  x   /  AST  23  /  ALT  24  /  AlkPhos  135<H>  05-18    LIVER FUNCTIONS - ( 18 May 2021 06:33 )  Alb: 2.2 g/dL / Pro: 6.0 g/dL / ALK PHOS: 135 U/L / ALT: 24 U/L DA / AST: 23 U/L / GGT: x                   CAPILLARY BLOOD GLUCOSE      RADIOLOGY & ADDITIONAL TESTS:

## 2021-05-18 NOTE — GOALS OF CARE CONVERSATION - ADVANCED CARE PLANNING - CONVERSATION DETAILS
Spoke with patient's daughter Leeanne via phone, offered to speak in [place], however she declined, preferred to speak in English. Discussed current condition, trajectory of advanced dementia, goals of care. She reports patient is min ambulatory and requires assistance w ADLs. Granddaughter is CDPAP. She has been losing weight, does not eat very much. Her main goal is to have her mother back home. Advised given patient's advancing dementia, patient is hospice eligible, hospice philosophy discussed. She is in agreement for home hospice services. Discussed risks/benefits of LST such as CPR/intubation, artificial nutrition/PEG in the context of advanced dementia. She prefers to allow for natural death and would not want feeding tubes. All questions answered, support provided. SW referral made.    Due to the patient's health status and restrictions on visitation during the Public Health Emergency, the Advance Care Planning service was performed via telephone with patient's __daughter_______.

## 2021-05-18 NOTE — PROGRESS NOTE ADULT - PROBLEM SELECTOR PLAN 2
- Urine culture is positive for Klebsiella.  - Sensitive to Rocephin   - Continue on Rocephin ( day 4 )

## 2021-05-19 DIAGNOSIS — I63.9 CEREBRAL INFARCTION, UNSPECIFIED: ICD-10-CM

## 2021-05-19 LAB
ALBUMIN SERPL ELPH-MCNC: 2.1 G/DL — LOW (ref 3.5–5)
ALP SERPL-CCNC: 138 U/L — HIGH (ref 40–120)
ALT FLD-CCNC: 21 U/L DA — SIGNIFICANT CHANGE UP (ref 10–60)
ANION GAP SERPL CALC-SCNC: 7 MMOL/L — SIGNIFICANT CHANGE UP (ref 5–17)
AST SERPL-CCNC: 20 U/L — SIGNIFICANT CHANGE UP (ref 10–40)
BILIRUB SERPL-MCNC: 0.4 MG/DL — SIGNIFICANT CHANGE UP (ref 0.2–1.2)
BUN SERPL-MCNC: 12 MG/DL — SIGNIFICANT CHANGE UP (ref 7–18)
CALCIUM SERPL-MCNC: 8.4 MG/DL — SIGNIFICANT CHANGE UP (ref 8.4–10.5)
CHLORIDE SERPL-SCNC: 114 MMOL/L — HIGH (ref 96–108)
CO2 SERPL-SCNC: 24 MMOL/L — SIGNIFICANT CHANGE UP (ref 22–31)
CREAT SERPL-MCNC: 0.97 MG/DL — SIGNIFICANT CHANGE UP (ref 0.5–1.3)
CULTURE RESULTS: SIGNIFICANT CHANGE UP
CULTURE RESULTS: SIGNIFICANT CHANGE UP
GLUCOSE BLDC GLUCOMTR-MCNC: 187 MG/DL — HIGH (ref 70–99)
GLUCOSE BLDC GLUCOMTR-MCNC: 208 MG/DL — HIGH (ref 70–99)
GLUCOSE SERPL-MCNC: 196 MG/DL — HIGH (ref 70–99)
HCT VFR BLD CALC: 35.5 % — SIGNIFICANT CHANGE UP (ref 34.5–45)
HGB BLD-MCNC: 11.6 G/DL — SIGNIFICANT CHANGE UP (ref 11.5–15.5)
MAGNESIUM SERPL-MCNC: 2 MG/DL — SIGNIFICANT CHANGE UP (ref 1.6–2.6)
MCHC RBC-ENTMCNC: 30.6 PG — SIGNIFICANT CHANGE UP (ref 27–34)
MCHC RBC-ENTMCNC: 32.7 GM/DL — SIGNIFICANT CHANGE UP (ref 32–36)
MCV RBC AUTO: 93.7 FL — SIGNIFICANT CHANGE UP (ref 80–100)
NRBC # BLD: 0 /100 WBCS — SIGNIFICANT CHANGE UP (ref 0–0)
PHOSPHATE SERPL-MCNC: 3.2 MG/DL — SIGNIFICANT CHANGE UP (ref 2.5–4.5)
PLATELET # BLD AUTO: 244 K/UL — SIGNIFICANT CHANGE UP (ref 150–400)
POTASSIUM SERPL-MCNC: 4 MMOL/L — SIGNIFICANT CHANGE UP (ref 3.5–5.3)
POTASSIUM SERPL-SCNC: 4 MMOL/L — SIGNIFICANT CHANGE UP (ref 3.5–5.3)
PROT SERPL-MCNC: 5.8 G/DL — LOW (ref 6–8.3)
RBC # BLD: 3.79 M/UL — LOW (ref 3.8–5.2)
RBC # FLD: 12.7 % — SIGNIFICANT CHANGE UP (ref 10.3–14.5)
SODIUM SERPL-SCNC: 145 MMOL/L — SIGNIFICANT CHANGE UP (ref 135–145)
SPECIMEN SOURCE: SIGNIFICANT CHANGE UP
SPECIMEN SOURCE: SIGNIFICANT CHANGE UP
WBC # BLD: 5 K/UL — SIGNIFICANT CHANGE UP (ref 3.8–10.5)
WBC # FLD AUTO: 5 K/UL — SIGNIFICANT CHANGE UP (ref 3.8–10.5)

## 2021-05-19 PROCEDURE — 99233 SBSQ HOSP IP/OBS HIGH 50: CPT | Mod: GC

## 2021-05-19 RX ORDER — ATORVASTATIN CALCIUM 80 MG/1
40 TABLET, FILM COATED ORAL AT BEDTIME
Refills: 0 | Status: DISCONTINUED | OUTPATIENT
Start: 2021-05-19 | End: 2021-05-19

## 2021-05-19 RX ORDER — CLOPIDOGREL BISULFATE 75 MG/1
75 TABLET, FILM COATED ORAL DAILY
Refills: 0 | Status: DISCONTINUED | OUTPATIENT
Start: 2021-05-19 | End: 2021-05-19

## 2021-05-19 RX ADMIN — Medication 81 MILLIGRAM(S): at 12:12

## 2021-05-19 RX ADMIN — ENOXAPARIN SODIUM 40 MILLIGRAM(S): 100 INJECTION SUBCUTANEOUS at 12:12

## 2021-05-19 RX ADMIN — Medication 2: at 12:12

## 2021-05-19 RX ADMIN — LISINOPRIL 2.5 MILLIGRAM(S): 2.5 TABLET ORAL at 05:50

## 2021-05-19 RX ADMIN — CEFTRIAXONE 100 MILLIGRAM(S): 500 INJECTION, POWDER, FOR SOLUTION INTRAMUSCULAR; INTRAVENOUS at 05:50

## 2021-05-19 NOTE — PROGRESS NOTE ADULT - ASSESSMENT
80F from home with daughters, arrived from [facility] 2/21, ambulates only with assistance with PMH DM, HTN, CVA 2019 (residual l-sided weakness), COVID 11/20, and Alzheimer dementia (baseline AAOx0-2), sent by PCP Dr. Gilmore 5/14 for nonspecific reports of "feeling poorly", cognitive decline, and x2 days questionably worsening weakness, admitted to tele for workup of worsening mental status.

## 2021-05-19 NOTE — PROGRESS NOTE ADULT - PROBLEM SELECTOR PLAN 2
sent by PCP Dr. Gilmore 5/14 for nonspecific reports of "feeling poorly", cognitive decline, and x2 days questionably worsening l-sided weakness   -in ED, afebrile, BP mildly elevated, VS otherwise wnl  -adm Na 133, , monitor routine daily labs  -ddx progression of Alzheimer's dz vs CVA/TIA vs infectious (see below section)   -s/p Haldol 10mg IV and Ativan 2mg IV 2/2 agitation in ED  -EKG NSR with incomplete RBBB  -CTH with chronic l frontal lobe and r inferior cerebellar lacunar infarcts, no acute findings  -will hold off further evaluation with repeat CTH, CTA H/N or MRI as clinical picture does not fit acute neuro event  -On asa, statin, and lisinopril for secondary prevention   -NPO until SS eval (except meds, ice chips); Speech and swallow pending.  -fu PT recs  -SW consulted for placement   - Patient is pending for MRI and EEG  - Neurology Dr. Kim sent by PCP Dr. Gilmore 5/14 for nonspecific reports of "feeling poorly", cognitive decline, and x2 days questionably worsening l-sided weakness   -in ED, afebrile, BP mildly elevated, VS otherwise wnl  -adm Na 133, , monitor routine daily labs  -ddx progression of Alzheimer's dz vs CVA/TIA vs infectious (see below section)   -s/p Haldol 10mg IV and Ativan 2mg IV 2/2 agitation in ED  -EKG NSR with incomplete RBBB  -CTH with chronic l frontal lobe and r inferior cerebellar lacunar infarcts, no acute findings  - MRI head showed Acute small right temporal lobe and left occipital lobe infarctions.  - Patient is comfort care; No further workup needed  -On [facility] soft diet  -Neurology Dr. Kim

## 2021-05-19 NOTE — PHYSICAL THERAPY INITIAL EVALUATION ADULT - PERTINENT HX OF CURRENT PROBLEM, REHAB EVAL
generalized weakness recent arrival from [place] generalized weakness recent arrival from [place]

## 2021-05-19 NOTE — HOSPICE CARE NOTE - CONVESATION DETAILS
Pt was referred for eval for home hospice svces.  Placed pc to dtr, Jazmyn.  She was speaking only a few words of English.  Told her I'd call   back using a .  Using [facility]  #[id number], called dtr back.  Only got her VM.  Did not hear the  speaking any msg.  So called again using #[id number].  Again got VM, & again asked  to leave the msg we had discussed, & again did not hear    saying anythin.  Left a msg in English.  Did not get any c/b, so texted msg to dtr's phone with my name & #.   Did not get any call.  Liaison to f/u tomorrow.   Pt was referred for eval for home hospice svces.  Placed pc to dtr, Jazmyn.  She was speaking only a few words of English.  Told her I'd call   back using a .  Using [employer]  #[id number] (for [place]), called dtr back.  Only got her VM.  Did not hear the  speaking any msg.  So called again using #[id number].  Again got VM, & again asked  to leave the msg we had discussed, & again did not hear    saying anythin.  Left a msg in English.  Did not get any c/b, so texted msg to dtr's phone with my name & #.   Did not get any call.  Liaison to f/u tomorrow.   Pt was referred for eval for home hospice svces.  Placed pc to dtr, Jazmyn.  She was speaking only a few words of English.  Told her I'd call   back using a .  Using [employer]  #[phone] (for [place]), called dtr back.  Only got her VM.  Did not hear the  speaking any msg.  So called again using #[id number].  Again got VM, & again asked  to leave the msg we had discussed, & again did not hear    saying anything.  Left a msg in English.  Did not get any c/b, so texted msg to dtr's phone with my name & #.   Did not get any call.  Liaison to f/u tomorrow.

## 2021-05-19 NOTE — PROGRESS NOTE ADULT - PROBLEM SELECTOR PLAN 1
- Home hospice referral   - DNR/DNI  - No Re- Hospitalization   - Dr. Dick is following - [facility]   - Home hospice  - DNR/DNI  - No Re- Hospitalization   - Dr. Dick is following

## 2021-05-19 NOTE — PROGRESS NOTE ADULT - PROBLEM SELECTOR PLAN 7
-no home meds, as discussed above  -on low-dose lisinopril with parameters, monitor Cr -[facility]

## 2021-05-19 NOTE — PROGRESS NOTE ADULT - SUBJECTIVE AND OBJECTIVE BOX
PGY-1 Progress Note discussed with attending    PAGER #: [-----] TILL 5:00 PM  PLEASE CONTACT ON CALL TEAM:  - On Call Team (Please refer to Dieter) FROM 5:00 PM - 8:30PM  - Nightfloat Team FROM 8:30 -7:30 AM    INTERVAL HPI/OVERNIGHT EVENTS: MRI showed Acute small right temporal lobe and left occipital lobe infarctions. Neurology is following. Patient is for home hospice.     MEDICATIONS:  acetaminophen   Tablet .. 650 milliGRAM(s) Oral every 6 hours PRN  aspirin enteric coated 81 milliGRAM(s) Oral daily  atorvastatin 40 milliGRAM(s) Oral at bedtime  atorvastatin 40 milliGRAM(s) Oral at bedtime  cefTRIAXone   IVPB 1000 milliGRAM(s) IV Intermittent every 24 hours  dextrose 5% + sodium chloride 0.45%. 1000 milliLiter(s) IV Continuous <Continuous>  enoxaparin Injectable 40 milliGRAM(s) SubCutaneous daily  ergocalciferol 47559 Unit(s) Oral <User Schedule>  insulin lispro (ADMELOG) corrective regimen sliding scale   SubCutaneous three times a day before meals  lisinopril 2.5 milliGRAM(s) Oral daily  thiamine 100 milliGRAM(s) Oral daily      REVIEW OF SYSTEMS:  CONSTITUTIONAL: No fever, weight loss, or fatigue  RESPIRATORY: No cough, wheezing, chills or hemoptysis; No shortness of breath  CARDIOVASCULAR: No chest pain, palpitations, dizziness, or leg swelling  GASTROINTESTINAL: No abdominal pain. No nausea, vomiting, or hematemesis; No diarrhea or constipation. No melena or hematochezia.  GENITOURINARY: No dysuria or hematuria, urinary frequency  NEUROLOGICAL: No headaches, memory loss, loss of strength, numbness, or tremors  SKIN: No itching, burning, rashes, or lesions     Vital Signs Last 24 Hrs  T(C): 36.1 (19 May 2021 04:56), Max: 36.9 (18 May 2021 14:25)  T(F): 97 (19 May 2021 04:56), Max: 98.5 (18 May 2021 21:39)  HR: 72 (19 May 2021 04:56) (72 - 85)  BP: 146/80 (19 May 2021 04:56) (112/65 - 175/88)  BP(mean): --  RR: 18 (19 May 2021 04:56) (18 - 18)  SpO2: 97% (19 May 2021 04:56) (97% - 98%)    PHYSICAL EXAMINATION:  GENERAL: AAO x1-2, not in acute distress.  HEENT: NCAT  RESP: Dec breathing in lower lobed bilaterally.  CV: RRR  GI: soft, ND  MSK: atraumatic   EXT: no edema noted   NEURO: Left side weakness and hyperreflexia   PSYCH: unable to assess  SKIN: no lesions noted, intact.                           11.6   5.00  )-----------( 244      ( 19 May 2021 07:22 )             35.5     05-19    145  |  114<H>  |  12  ----------------------------<  196<H>  4.0   |  24  |  0.97    Ca    8.4      19 May 2021 07:22  Phos  3.2     05-19  Mg     2.0     05-19    TPro  5.8<L>  /  Alb  2.1<L>  /  TBili  0.4  /  DBili  x   /  AST  20  /  ALT  21  /  AlkPhos  138<H>  05-19    LIVER FUNCTIONS - ( 19 May 2021 07:22 )  Alb: 2.1 g/dL / Pro: 5.8 g/dL / ALK PHOS: 138 U/L / ALT: 21 U/L DA / AST: 20 U/L / GGT: x                   CAPILLARY BLOOD GLUCOSE      RADIOLOGY & ADDITIONAL TESTS:                   PGY-1 Progress Note discussed with attending    PAGER #: [-----] TILL 5:00 PM  PLEASE CONTACT ON CALL TEAM:  - On Call Team (Please refer to Dieter) FROM 5:00 PM - 8:30PM  - Nightfloat Team FROM 8:30 -7:30 AM    INTERVAL HPI/OVERNIGHT EVENTS: MRI showed Acute small right temporal lobe and left occipital lobe infarctions. Neurology is following. Patient is for [facility]/ home hospice.     MEDICATIONS:  acetaminophen   Tablet .. 650 milliGRAM(s) Oral every 6 hours PRN  aspirin enteric coated 81 milliGRAM(s) Oral daily  atorvastatin 40 milliGRAM(s) Oral at bedtime  atorvastatin 40 milliGRAM(s) Oral at bedtime  cefTRIAXone   IVPB 1000 milliGRAM(s) IV Intermittent every 24 hours  dextrose 5% + sodium chloride 0.45%. 1000 milliLiter(s) IV Continuous <Continuous>  enoxaparin Injectable 40 milliGRAM(s) SubCutaneous daily  ergocalciferol 85439 Unit(s) Oral <User Schedule>  insulin lispro (ADMELOG) corrective regimen sliding scale   SubCutaneous three times a day before meals  lisinopril 2.5 milliGRAM(s) Oral daily  thiamine 100 milliGRAM(s) Oral daily      REVIEW OF SYSTEMS:  CONSTITUTIONAL: No fever, weight loss, or fatigue  RESPIRATORY: No cough, wheezing, chills or hemoptysis; No shortness of breath  CARDIOVASCULAR: No chest pain, palpitations, dizziness, or leg swelling  GASTROINTESTINAL: No abdominal pain. No nausea, vomiting, or hematemesis; No diarrhea or constipation. No melena or hematochezia.  GENITOURINARY: No dysuria or hematuria, urinary frequency  NEUROLOGICAL: No headaches, memory loss, loss of strength, numbness, or tremors  SKIN: No itching, burning, rashes, or lesions     Vital Signs Last 24 Hrs  T(C): 36.1 (19 May 2021 04:56), Max: 36.9 (18 May 2021 14:25)  T(F): 97 (19 May 2021 04:56), Max: 98.5 (18 May 2021 21:39)  HR: 72 (19 May 2021 04:56) (72 - 85)  BP: 146/80 (19 May 2021 04:56) (112/65 - 175/88)  BP(mean): --  RR: 18 (19 May 2021 04:56) (18 - 18)  SpO2: 97% (19 May 2021 04:56) (97% - 98%)    PHYSICAL EXAMINATION:  GENERAL: AAO x1-2, not in acute distress.  HEENT: NCAT  RESP: Dec breathing in lower lobed bilaterally.  CV: RRR  GI: soft, ND  MSK: atraumatic   EXT: no edema noted   NEURO: Left side weakness and hyperreflexia   PSYCH: unable to assess  SKIN: no lesions noted, intact.                           11.6   5.00  )-----------( 244      ( 19 May 2021 07:22 )             35.5     05-19    145  |  114<H>  |  12  ----------------------------<  196<H>  4.0   |  24  |  0.97    Ca    8.4      19 May 2021 07:22  Phos  3.2     05-19  Mg     2.0     05-19    TPro  5.8<L>  /  Alb  2.1<L>  /  TBili  0.4  /  DBili  x   /  AST  20  /  ALT  21  /  AlkPhos  138<H>  05-19    LIVER FUNCTIONS - ( 19 May 2021 07:22 )  Alb: 2.1 g/dL / Pro: 5.8 g/dL / ALK PHOS: 138 U/L / ALT: 21 U/L DA / AST: 20 U/L / GGT: x                   CAPILLARY BLOOD GLUCOSE      RADIOLOGY & ADDITIONAL TESTS:

## 2021-05-19 NOTE — PROGRESS NOTE ADULT - PROBLEM SELECTOR PLAN 3
- Adm +UA, patient unable to respond regarding symptoms.  - Urine culture is positive for Klebsiella; Pending sensitivity.   - Continue on Rocephin for 4 more days. - Adm +UA, patient unable to respond regarding symptoms.  - Urine culture is positive for Klebsiella;   - DC Rocephin; [facility]

## 2021-05-19 NOTE — PHYSICAL THERAPY INITIAL EVALUATION ADULT - IMPAIRMENTS FOUND, PT EVAL
aerobic capacity/endurance/fine motor/gait, locomotion, and balance/gross motor/muscle strength/posture

## 2021-05-19 NOTE — PROGRESS NOTE ADULT - SUBJECTIVE AND OBJECTIVE BOX
TEMPLATE ONLY    Neurology Follow up note    Name  LORI NY    HPI:  80F from home with daughters[facility] ambulates only with assistance with PMH DM, HTN, CVA 2019 (residual l-sided weakness), COVID 11/20, and Alzheimer dementia (baseline AAOx0-2), sent by PCP Dr. Gilmore 5/14 for nonspecific reports of "feeling poorly", cognitive decline, and x2 days questionably worsening l-sided weakness. In ED, patient agitated, confused, disoriented, given 10mg Haldol IV and 2mg Ativan IV, unable to provide history, obtained from daughter Leeanne.     In ED, BP mildly elevated 143/83, VS otherwise wnl. Adm Na 133, , , +UA. COVID negative. EKG NSR with incomplete RBBB. CXR mild b/l infiltrates. CTH with chronic l frontal lobe and r inferior cerebellar lacunar infarcts, no acute findings. Admitted to [facility] for workup of worsening mental status.       (14 May 2021 23:48)      Interval History -        Subjective:    Review of Systems:  Constitutional:        Eyes, Ears, Mouth, Throat:   Respiratory:                            Cardiovascular:   Gastrointestinal:                                     Genitourinary:   Musculoskeletal:                                    Dermatologic:   Neurological: as per above                                                                 Psychiatric:   Endocrine:              Hematologic/Lymphatic:     MEDICATIONS  (STANDING):  aspirin enteric coated 81 milliGRAM(s) Oral daily  atorvastatin 40 milliGRAM(s) Oral at bedtime  atorvastatin 40 milliGRAM(s) Oral at bedtime  cefTRIAXone   IVPB 1000 milliGRAM(s) IV Intermittent every 24 hours  dextrose 5% + sodium chloride 0.45%. 1000 milliLiter(s) (90 mL/Hr) IV Continuous <Continuous>  enoxaparin Injectable 40 milliGRAM(s) SubCutaneous daily  ergocalciferol 77599 Unit(s) Oral <User Schedule>  insulin lispro (ADMELOG) corrective regimen sliding scale   SubCutaneous three times a day before meals  lisinopril 2.5 milliGRAM(s) Oral daily  thiamine 100 milliGRAM(s) Oral daily    MEDICATIONS  (PRN):  acetaminophen   Tablet .. 650 milliGRAM(s) Oral every 6 hours PRN Mild Pain (1 - 3), Moderate Pain (4 - 6)      Allergies    No Known Allergies    Intolerances        Objective:   Vital Signs Last 24 Hrs  T(C): 36.1 (19 May 2021 04:56), Max: 36.9 (18 May 2021 14:25)  T(F): 97 (19 May 2021 04:56), Max: 98.5 (18 May 2021 21:39)  HR: 72 (19 May 2021 04:56) (72 - 85)  BP: 146/80 (19 May 2021 04:56) (112/65 - 175/88)  BP(mean): --  RR: 18 (19 May 2021 04:56) (18 - 18)  SpO2: 97% (19 May 2021 04:56) (97% - 98%)    General Exam:   General appearance: No acute distress                 Cardiovascular: Pedal dorsalis pulses intact bilaterally    Neurological Exam:  Mental Status: Orientated to self, date and place.  Attention intact.  No dysarthria, aphasia or neglect.  Knowledge intact.  Registration intact.  Short and long term memory grossly intact.      Cranial Nerves: CN I - not tested.  PERRL, EOMI, VFF, no nystagmus or diplopia.  No APD.  Fundi not visualized bilaterally.  CN V1-3 intact to light touch and pinprick.  No facial asymmetry.  Hearing intact to finger rub bilaterally.  Tongue, uvula and palate midline.  Sternocleidomastoid and Trapezius intact bilaterally.    Motor:   Tone: normal.                  Strength: intact throughout  Pronator drift: none                 Dysmeria: None to finger-nose-finger or heel-shin-heel  No truncal ataxia.    Tremor: No resting, postural or action tremor.  No myoclonus.    Sensation: intact to light touch, pinprick, vibration and proprioception    Deep Tendon Reflexes: 1+ bilateral biceps, triceps, brachioradialis, knee and ankle  Toes flexor bilaterally    Gait: normal and stable.      Other:    05-19    145  |  114<H>  |  12  ----------------------------<  196<H>  4.0   |  24  |  0.97    Ca    8.4      19 May 2021 07:22  Phos  3.2     05-19  Mg     2.0     05-19    TPro  5.8<L>  /  Alb  2.1<L>  /  TBili  0.4  /  DBili  x   /  AST  20  /  ALT  21  /  AlkPhos  138<H>  05-19 05-19    145  |  114<H>  |  12  ----------------------------<  196<H>  4.0   |  24  |  0.97    Ca    8.4      19 May 2021 07:22  Phos  3.2     05-19  Mg     2.0     05-19    TPro  5.8<L>  /  Alb  2.1<L>  /  TBili  0.4  /  DBili  x   /  AST  20  /  ALT  21  /  AlkPhos  138<H>  05-19    LIVER FUNCTIONS - ( 19 May 2021 07:22 )  Alb: 2.1 g/dL / Pro: 5.8 g/dL / ALK PHOS: 138 U/L / ALT: 21 U/L DA / AST: 20 U/L / GGT: x             Radiology    EEG Classification / Summary:  Abnormal Routine EEG Study   Probable sharp waves, L>R temporal   Continuous polymorphic and rhythmical slowing, focal, L > R temporal.    Clinical Impression:  There is evidence of L>R temporal focal dysfunction and increased irritability and potential for seizures. There is evidence of structural abnormality in the L>R frontotemporal region. There is also evidence of diffuse cerebral dysfunction that is not specific for etiology. < from: MR Head No Cont (05.18.21 @ 19:40) >  IMPRESSION: Acute small right temporal lobe and left occipital lobe infarctions.    < end of copied text >                  Neurology Follow up note    Name  LORI NY      Subjective:  no clinical change    Review of Systems:  Constitutional:      no fever  Respiratory:        no cough                      MEDICATIONS  (STANDING):  aspirin enteric coated 81 milliGRAM(s) Oral daily  atorvastatin 40 milliGRAM(s) Oral at bedtime  atorvastatin 40 milliGRAM(s) Oral at bedtime  cefTRIAXone   IVPB 1000 milliGRAM(s) IV Intermittent every 24 hours  dextrose 5% + sodium chloride 0.45%. 1000 milliLiter(s) (90 mL/Hr) IV Continuous <Continuous>  enoxaparin Injectable 40 milliGRAM(s) SubCutaneous daily  ergocalciferol 79323 Unit(s) Oral <User Schedule>  insulin lispro (ADMELOG) corrective regimen sliding scale   SubCutaneous three times a day before meals  lisinopril 2.5 milliGRAM(s) Oral daily  thiamine 100 milliGRAM(s) Oral daily    MEDICATIONS  (PRN):  acetaminophen   Tablet .. 650 milliGRAM(s) Oral every 6 hours PRN Mild Pain (1 - 3), Moderate Pain (4 - 6)      Allergies    No Known Allergies    Intolerances        Objective:   Vital Signs Last 24 Hrs  T(C): 36.1 (19 May 2021 04:56), Max: 36.9 (18 May 2021 14:25)  T(F): 97 (19 May 2021 04:56), Max: 98.5 (18 May 2021 21:39)  HR: 72 (19 May 2021 04:56) (72 - 85)  BP: 146/80 (19 May 2021 04:56) (112/65 - 175/88)  BP(mean): --  RR: 18 (19 May 2021 04:56) (18 - 18)  SpO2: 97% (19 May 2021 04:56) (97% - 98%)    General Exam:   General appearance: No acute distress                 Cardiovascular: Pedal dorsalis pulses intact bilaterally    Neurological Exam:    Cranial Nerves: CN I - not tested.  PERRL, EOMI, VFF, no nystagmus or diplopia.  No APD.  Fundi not visualized bilaterally.   No facial asymmetry.    Sensation: intact to light touch  Other:    05-19    145  |  114<H>  |  12  ----------------------------<  196<H>  4.0   |  24  |  0.97    Ca    8.4      19 May 2021 07:22  Phos  3.2     05-19  Mg     2.0     05-19    TPro  5.8<L>  /  Alb  2.1<L>  /  TBili  0.4  /  DBili  x   /  AST  20  /  ALT  21  /  AlkPhos  138<H>  05-19 05-19    145  |  114<H>  |  12  ----------------------------<  196<H>  4.0   |  24  |  0.97    Ca    8.4      19 May 2021 07:22  Phos  3.2     05-19  Mg     2.0     05-19    TPro  5.8<L>  /  Alb  2.1<L>  /  TBili  0.4  /  DBili  x   /  AST  20  /  ALT  21  /  AlkPhos  138<H>  05-19    LIVER FUNCTIONS - ( 19 May 2021 07:22 )  Alb: 2.1 g/dL / Pro: 5.8 g/dL / ALK PHOS: 138 U/L / ALT: 21 U/L DA / AST: 20 U/L / GGT: x             Radiology    EEG Classification / Summary:  Abnormal Routine EEG Study   Probable sharp waves, L>R temporal   Continuous polymorphic and rhythmical slowing, focal, L > R temporal.    Clinical Impression:  There is evidence of L>R temporal focal dysfunction and increased irritability and potential for seizures. There is evidence of structural abnormality in the L>R frontotemporal region. There is also evidence of diffuse cerebral dysfunction that is not specific for etiology. < from: MR Head No Cont (05.18.21 @ 19:40) >  IMPRESSION: Acute small right temporal lobe and left occipital lobe infarctions.    < end of copied text >             < from: MR Head No Cont (05.18.21 @ 19:40) >  IMPRESSION: Acute small right temporal lobe and left occipital lobe infarctions.    < end of copied text >

## 2021-05-19 NOTE — PROGRESS NOTE ADULT - ASSESSMENT
< from: MR Head No Cont (05.18.21 @ 19:40) >  IMPRESSION: Acute small right temporal lobe and left occipital lobe infarctions.    < end of copied text >    needs royer for embolic cva   Embolic stroke    recommended JESIKA but pt's family would like comfort measures and declines further eval.  cw asa and statin.  can consider hydration with IVF (NS) to improve perfusion    dw resident

## 2021-05-19 NOTE — PHYSICAL THERAPY INITIAL EVALUATION ADULT - GENERAL OBSERVATIONS, REHAB EVAL
pt revisit cooperative with referral, +mr brain, appears negative edmundo deficits, speech slurring noted, able to transfers with rw and therapist assistance

## 2021-05-19 NOTE — PROGRESS NOTE ADULT - PROBLEM SELECTOR PLAN 4
-PMH DM, no home meds per daughter 2/2 "insurance issues"  -adm   -On HSS q6 while NPO  - Inc lantus to 12 units   - A1C is 14.9  -Once diet started, diabetic/DASH  -Dr. Solano consulted   -rajesh MELARA consult for insurance issues -PMH DM, no home meds per daughter 2/2 "insurance issues"  -adm   - A1C is 14.9  - [facility]

## 2021-05-20 LAB — GLUCOSE BLDC GLUCOMTR-MCNC: 150 MG/DL — HIGH (ref 70–99)

## 2021-05-20 PROCEDURE — 99232 SBSQ HOSP IP/OBS MODERATE 35: CPT | Mod: GC

## 2021-05-20 NOTE — DISCHARGE NOTE PROVIDER - HOSPITAL COURSE
80F from home with daughters[facility], ambulates only with assistance with PMH DM, HTN, CVA 2019 (residual l-sided weakness), COVID 11/20, and Alzheimer dementia (baseline AAOx0-2), sent by PCP nonspecific reports of "feeling poorly", cognitive decline, and x2 days questionably worsening weakness, admitted to tele for workup of worsening mental status. Patient had UTI and was treated by Rocephin.  CTH with chronic l frontal lobe and r inferior cerebellar lacunar infarcts. MRI head showed Acute small right temporal lobe and left occipital lobe infarctions. Patiemt has poor oral intake so was started on IV fluids. Palliative was consulted. Patient is DNR/DNI- Home hospice- [facility].  No- rehospitalization. Patient will be discharged to home hospice.   80F from home with daughters, arrived from [facility], ambulates only with assistance with PMH DM, HTN, CVA 2019 (residual l-sided weakness), COVID 11/20, and Alzheimer dementia (baseline AAOx0-2), sent by PCP nonspecific reports of "feeling poorly", cognitive decline, and x2 days questionably worsening weakness, admitted to tele for workup of worsening mental status. Patient had UTI and was treated by Rocephin.  CTH with chronic l-frontal lobe and rt-inferior cerebellar lacunar infarcts. MRI head showed Acute small right temporal lobe and left occipital lobe infarctions. Patiemt has poor oral intake so was started on IV fluids. Palliative was consulted. Patient is DNR/DNI- Home hospice- [facility].  No- rehospitalization. Patient will be discharged with home hospice.

## 2021-05-20 NOTE — DISCHARGE NOTE PROVIDER - NSDCCPCAREPLAN_GEN_ALL_CORE_FT
PRINCIPAL DISCHARGE DIAGNOSIS  Diagnosis: Altered mental status, unspecified altered mental status type  Assessment and Plan of Treatment: You presented with altered mental status possible due to stroke. You have history of stroke before with residual deficits. MRI head showed acute infarctions in temproal and occiptal lobe possible ischemic. Palliative was consulted. DNR-DNI. Family wants the patient comfort care. Patient will be discharged to home hospice.      SECONDARY DISCHARGE DIAGNOSES  Diagnosis: UTI (urinary tract infection)  Assessment and Plan of Treatment: You presented with ltered mental status. You have UTI that was treated with rocephin. Patient will be discharged to home hospice- [facility].    Diagnosis: Alzheimer's dementia  Assessment and Plan of Treatment: Patient is comfort care - Home hospice. No further medications.     PRINCIPAL DISCHARGE DIAGNOSIS  Diagnosis: Altered mental status, unspecified altered mental status type  Assessment and Plan of Treatment: You presented with altered mental status possible due to stroke. You have history of stroke before with residual deficits. MRI head showed acute infarctions in temproal and occiptal lobe possible ischemic. Palliative was consulted. DNR-DNI. Family wants the patient comfort care. Patient will be discharged with home hospice.      SECONDARY DISCHARGE DIAGNOSES  Diagnosis: UTI (urinary tract infection)  Assessment and Plan of Treatment: You presented with ltered mental status. You have UTI that was treated with rocephin. Patient will be discharged to home hospice- [facility].    Diagnosis: DM (diabetes mellitus)  Assessment and Plan of Treatment: Patient is comfort care - Home hospice. No further medications.    Diagnosis: HTN (hypertension)  Assessment and Plan of Treatment: Patient is comfort care - Home hospice. No further medications.    Diagnosis: Alzheimer's dementia  Assessment and Plan of Treatment: Patient is comfort care - Home hospice. No further medications.

## 2021-05-20 NOTE — PROGRESS NOTE ADULT - SUBJECTIVE AND OBJECTIVE BOX
PGY-1 Progress Note discussed with attending    PAGER #: [-----] TILL 5:00 PM  PLEASE CONTACT ON CALL TEAM:  - On Call Team (Please refer to Dieter) FROM 5:00 PM - 8:30PM  - Nightfloat Team FROM 8:30 -7:30 AM    INTERVAL HPI/OVERNIGHT EVENTS: No acute events overnight. Patient is comfort care.    MEDICATIONS:  dextrose 5% + sodium chloride 0.45%. 1000 milliLiter(s) IV Continuous <Continuous>      REVIEW OF SYSTEMS:  CONSTITUTIONAL: No fever, weight loss, or fatigue  RESPIRATORY: No cough, wheezing, chills or hemoptysis; No shortness of breath  CARDIOVASCULAR: No chest pain, palpitations, dizziness, or leg swelling  GASTROINTESTINAL: No abdominal pain. No nausea, vomiting, or hematemesis; No diarrhea or constipation. No melena or hematochezia.  GENITOURINARY: No dysuria or hematuria, urinary frequency  NEUROLOGICAL: No headaches, memory loss, loss of strength, numbness, or tremors  SKIN: No itching, burning, rashes, or lesions     Vital Signs Last 24 Hrs  T(C): 36.6 (20 May 2021 13:46), Max: 36.6 (19 May 2021 21:45)  T(F): 97.9 (20 May 2021 13:46), Max: 97.9 (19 May 2021 21:45)  HR: 74 (20 May 2021 13:46) (64 - 74)  BP: 164/89 (20 May 2021 13:46) (101/64 - 164/89)  BP(mean): --  RR: 18 (20 May 2021 13:46) (18 - 18)  SpO2: 99% (20 May 2021 13:46) (96% - 100%)    PHYSICAL EXAMINATION:  Mews exam                        11.6   5.00  )-----------( 244      ( 19 May 2021 07:22 )             35.5     05-19    145  |  114<H>  |  12  ----------------------------<  196<H>  4.0   |  24  |  0.97    Ca    8.4      19 May 2021 07:22  Phos  3.2     05-19  Mg     2.0     05-19    TPro  5.8<L>  /  Alb  2.1<L>  /  TBili  0.4  /  DBili  x   /  AST  20  /  ALT  21  /  AlkPhos  138<H>  05-19    LIVER FUNCTIONS - ( 19 May 2021 07:22 )  Alb: 2.1 g/dL / Pro: 5.8 g/dL / ALK PHOS: 138 U/L / ALT: 21 U/L DA / AST: 20 U/L / GGT: x                   CAPILLARY BLOOD GLUCOSE      RADIOLOGY & ADDITIONAL TESTS:

## 2021-05-20 NOTE — DISCHARGE NOTE PROVIDER - ATTENDING COMMENTS
MRI showed Acute small right temporal lobe and left occipital lobe infarctions. EEG showed evidence of L>R temporal focal dysfunction and increased irritability and potential for seizures. There is evidence of structural abnormality in the L>R frontotemporal region. There is also evidence of diffuse cerebral dysfunction that is not specific for etiology. After discussion with Palliative, Dr. Dick and daughter Leeanne, daughter decided for comfort measures only, no further work up of new stroke, and would like to take her mom home with home hospice. She is for discharge with home hospice.      Discharge time spent: 30 minutes   Patient seen and examined on day of discharge 5/22. Vitals reviewed, labs stopped due to comfort measures. Appears comfortable on exam, at baseline demented mental status, calm and cooperative. She was admitted for worsening mental status, MRI showed Acute small right temporal lobe and left occipital lobe infarctions. EEG showed evidence of L>R temporal focal dysfunction and increased irritability and potential for seizures. There is evidence of structural abnormality in the L>R frontotemporal region. There is also evidence of diffuse cerebral dysfunction that is not specific for etiology. After discussion with Palliative, Dr. Dick and daughter Leeanne, daughter decided for comfort measures only, no further work up of new stroke, and would like to take her mom home with home hospice. She is for discharge with home hospice.      Discharge time spent: 30 minutes

## 2021-05-20 NOTE — PROGRESS NOTE ADULT - PROBLEM SELECTOR PLAN 3
- Adm +UA, patient unable to respond regarding symptoms.  - Urine culture is positive for Klebsiella;   - DC Rocephin; Comfort care

## 2021-05-20 NOTE — HOSPICE CARE NOTE - CONVESATION DETAILS
Call placed to pt's dtr Jelly [phone]. Unable to reach. Left her a v/m and left my contact number requesting a call back. Will cont to f/u as needed.    Noa Muñoz Rn  [phone]

## 2021-05-20 NOTE — PROGRESS NOTE ADULT - ASSESSMENT
80F from home with daughters, arrived from [facility] 2/21, ambulates only with assistance with PMH DM, HTN, CVA 2019 (residual l-sided weakness), COVID 11/20, and Alzheimer dementia (baseline AAOx0-2), sent by PCP Dr. Gilmore 5/14 for nonspecific reports of "feeling poorly", cognitive decline, and x2 days questionably worsening weakness, admitted to tele for workup of worsening mental status. Patient is for home hospice. Comfort care.

## 2021-05-20 NOTE — PROGRESS NOTE ADULT - PROBLEM SELECTOR PLAN 2
sent by PCP Dr. Gilmroe 5/14 for nonspecific reports of "feeling poorly", cognitive decline, and x2 days questionably worsening l-sided weakness   -in ED, afebrile, BP mildly elevated, VS otherwise wnl  -adm Na 133, , monitor routine daily labs  -ddx progression of Alzheimer's dz vs CVA/TIA vs infectious (see below section)   -s/p Haldol 10mg IV and Ativan 2mg IV 2/2 agitation in ED  -EKG NSR with incomplete RBBB  -CTH with chronic l frontal lobe and r inferior cerebellar lacunar infarcts, no acute findings  - MRI head showed Acute small right temporal lobe and left occipital lobe infarctions.  - Patient is comfort care; No further workup needed  -On [facility] soft diet  - On IV fluids due to poor PO intake  -Neurology Dr. Kim

## 2021-05-21 DIAGNOSIS — Z71.89 OTHER SPECIFIED COUNSELING: ICD-10-CM

## 2021-05-21 LAB — SARS-COV-2 RNA SPEC QL NAA+PROBE: SIGNIFICANT CHANGE UP

## 2021-05-21 PROCEDURE — 99232 SBSQ HOSP IP/OBS MODERATE 35: CPT | Mod: GC

## 2021-05-21 PROCEDURE — 99239 HOSP IP/OBS DSCHRG MGMT >30: CPT | Mod: GC

## 2021-05-21 RX ORDER — LACTULOSE 10 G/15ML
15 SOLUTION ORAL
Qty: 0 | Refills: 0 | DISCHARGE

## 2021-05-21 RX ORDER — DIPHENHYDRAMINE HCL 50 MG
50 CAPSULE ORAL EVERY 6 HOURS
Refills: 0 | Status: DISCONTINUED | OUTPATIENT
Start: 2021-05-21 | End: 2021-05-22

## 2021-05-21 NOTE — PROGRESS NOTE ADULT - PROBLEM SELECTOR PROBLEM 2
Altered mental status, unspecified altered mental status type
UTI (urinary tract infection)
Altered mental status, unspecified altered mental status type
UTI (urinary tract infection)

## 2021-05-21 NOTE — PROGRESS NOTE ADULT - PROBLEM SELECTOR PLAN 8
- Comfort care
-full code per daughter Leeanne  -primary team to consult palliative care in am (order placed)
-full code per daughter Leeanne  -primary team to consult palliative care in am (order placed)
-improve 2-4 (age, immobilization, chronic LLE weakness/paralysis)  -DVT PPX SQL  -no need for GI PPX at this time
-full code per daughter Leeanne  -Palliative was consulted but was unable to reach the family
- Comfort care

## 2021-05-21 NOTE — PROGRESS NOTE ADULT - PROBLEM SELECTOR PROBLEM 4
Alzheimer's dementia
Alzheimer's dementia
DM (diabetes mellitus)
DM (diabetes mellitus)
Alzheimer's dementia
DM (diabetes mellitus)

## 2021-05-21 NOTE — PROGRESS NOTE ADULT - PROBLEM SELECTOR PROBLEM 3
DM (diabetes mellitus)
DM (diabetes mellitus)
UTI (urinary tract infection)
DM (diabetes mellitus)
UTI (urinary tract infection)
UTI (urinary tract infection)

## 2021-05-21 NOTE — PROGRESS NOTE ADULT - PROBLEM SELECTOR PROBLEM 1
Cerebrovascular accident (CVA), unspecified mechanism
UTI (urinary tract infection)
Altered mental status, unspecified altered mental status type
Goals of care, counseling/discussion
Goals of care, counseling/discussion
Altered mental status, unspecified altered mental status type
Goals of care, counseling/discussion

## 2021-05-21 NOTE — PROGRESS NOTE ADULT - PROBLEM SELECTOR PLAN 2
sent by PCP Dr. Gilmore 5/14 for nonspecific reports of "feeling poorly", cognitive decline, and x2 days questionably worsening l-sided weakness   -in ED, afebrile, BP mildly elevated, VS otherwise wnl  -adm Na 133, , monitor routine daily labs  -ddx progression of Alzheimer's dz vs CVA/TIA vs infectious (see below section)   -s/p Haldol 10mg IV and Ativan 2mg IV 2/2 agitation in ED  -EKG NSR with incomplete RBBB  -CTH with chronic l frontal lobe and r inferior cerebellar lacunar infarcts, no acute findings  - MRI head showed Acute small right temporal lobe and left occipital lobe infarctions.  - Patient is comfort care; No further workup needed  -On [facility] soft diet  - On IV fluids due to poor PO intake  -Neurology Dr. Kim

## 2021-05-21 NOTE — PROGRESS NOTE ADULT - PROBLEM SELECTOR PROBLEM 7
HTN (hypertension)
HTN (hypertension)
Prophylactic measure
HTN (hypertension)

## 2021-05-21 NOTE — PROGRESS NOTE ADULT - REASON FOR ADMISSION
left-sided weakness

## 2021-05-21 NOTE — PROGRESS NOTE ADULT - PROBLEM SELECTOR PROBLEM 5
Alzheimer's dementia
History of 2019 novel coronavirus disease (COVID-19)
History of 2019 novel coronavirus disease (COVID-19)
Alzheimer's dementia
History of 2019 novel coronavirus disease (COVID-19)
Alzheimer's dementia

## 2021-05-21 NOTE — PROGRESS NOTE ADULT - PROVIDER SPECIALTY LIST ADULT
Endocrinology
Endocrinology
Neurology
Endocrinology
Internal Medicine

## 2021-05-21 NOTE — PROGRESS NOTE ADULT - PROBLEM SELECTOR PROBLEM 6
History of 2019 novel coronavirus disease (COVID-19)
History of 2019 novel coronavirus disease (COVID-19)
HTN (hypertension)
History of 2019 novel coronavirus disease (COVID-19)

## 2021-05-21 NOTE — PROGRESS NOTE ADULT - PROBLEM SELECTOR PROBLEM 8
Prophylactic measure
Prophylactic measure
Goals of care, counseling/discussion
Prophylactic measure

## 2021-05-21 NOTE — PROGRESS NOTE ADULT - ATTENDING COMMENTS
Patient seen and examined this morning with medical team. MRI showed Acute small right temporal lobe and left occipital lobe infarctions. EEG showed evidence of L>R temporal focal dysfunction and increased irritability and potential for seizures. There is evidence of structural abnormality in the L>R frontotemporal region. There is also evidence of diffuse cerebral dysfunction that is not specific for etiology. After discussion with Palliative, Dr. Dick and daughter Leeanne, daughter decided for comfort measures only, no further work up of new stroke, and would like to take her mom home with home hospice. Hospice referral made, plan for discharge with home hospice today.    Rest as per resident's note.
Patient seen and examined this morning with medical team. MRI showed Acute small right temporal lobe and left occipital lobe infarctions. EEG showed evidence of L>R temporal focal dysfunction and increased irritability and potential for seizures. There is evidence of structural abnormality in the L>R frontotemporal region. There is also evidence of diffuse cerebral dysfunction that is not specific for etiology. After discussion with Palliative, Dr. Dick and daughter Leeanne, daughter decided for comfort measures only, no further work up of new stroke, and would like to take her mom home with home hospice. Hospice referral made, will discharge tomorrow once home hospice arranged, possibly tomorrow.    Rest as per resident's note.
Patient was examined this AM and discussed with covering resident and with Neurology.     She was brought by family because of progressive dementia with a history of left-sided weakness.   On admission she was encephalopathic and did not communicate.  Today she is partially responsive to verbal stimuli, and although she is verbalizing, she is unable to carry on a conversation.     Bedbound, elderly woman in NAD  Vital Signs Last 24 Hrs  T(C): 36.4 (16 May 2021 14:22), Max: 37.4 (15 May 2021 18:27)  T(F): 97.6 (16 May 2021 14:22), Max: 99.4 (15 May 2021 18:27)  HR: 79 (16 May 2021 14:22) (79 - 104)  BP: 155/66 (16 May 2021 14:22) (113/76 - 157/67)  BP(mean): --  RR: 18 (16 May 2021 14:22) (18 - 18)  SpO2: 95% (16 May 2021 14:22) (95% - 98%)  Neck, supple  Lungs, bilateral air entry  Cor, RRR  Abdomen, soft  Neurological, responsive, verbal, but not conversant.  Left hemiparesis and left facial weakness with left hyperreflexia.                         13.3   6.77  )-----------( 241      ( 16 May 2021 11:20 )             39.5   05-16    141  |  107  |  14  ----------------------------<  119<H>  3.6   |  26  |  0.79    Ca    8.8      16 May 2021 11:20  Phos  3.7     05-15  Mg     1.9     05-15    TPro  6.8  /  Alb  2.6<L>  /  TBili  1.2  /  DBili  x   /  AST  34  /  ALT  37  /  AlkPhos  176<H>  05-16    < from: Xray Chest 1 View- PORTABLE-Routine (Xray Chest 1 View- PORTABLE-Routine in AM.) (05.16.21 @ 11:18) >    Increased bronchovascular markings. No consolidation.    No pleural effusion or pneumothorax.    < end of copied text >    < from: CT Head No Cont (05.14.21 @ 21:31) >    No osseous abnormality seen.    IMPRESSION: No intracranial hemorrhage or mass effect. Encephalomalacia/gliosis in the left frontal lobe, likely due to prior infarct. Chronic right inferior cerebellar lacunar infarct.    < end of copied text >    Culture Results:   >100,000 CFU/ml Gram Negative Rods (05.15.21 @ 03:19)   A1C with Estimated Average Glucose Result: 14.9: Method: Immunoassay   lLipid Profile in AM (05.15.21 @ 07:11)   Cholesterol, Serum: 192 mg/dL   Triglycerides, Serum: 173 mg/dL   HDL Cholesterol, Serum: 37 mg/dL     IMP:  Encephalopathy in a patient with progressive dementia, mental status likely exacerbated by toxicity of UTI. Improving clinically.           Hx CVA, findings c/w old CVA.  r/o seizure.           DM with poor outpatient control of glucose          Bedbound patient with progressive dementia may need LTC, as family stated to admitting team.           Normal cholesterol may reflect protein-calorie malnutrition  Plan:  I have attempted to contact patient's family at [phone], the number listed in the chart.  I have left messages, but have not received a                  return phone call.            Continue antibiotic Rx f/u urine culture           EEG.  No further imaging, at present.           Speech/Swallow evaluation.            Continue D5/1/2 nss while NPO with standing and corrective insulin.            Palliative Care consultation.                     Speech/swallow evaluation
Patient seen and examined this morning with medical team. Continue ceftriaxone for UTI, will switch to Ceftin 500 bid on discharge to complete a total of 10 days of abx. F/up MRI and EEG, will obtain PT eval. Hold lantus for hypoglycemia, will plan to send on oral hypoglycemic unless becoming hyperglycemic. F/up palliative, endocrine, and neurology.    Rest as per resident's note.
79 yo woman brought by family because of progressive dementia with a history of left-sided weakness.   On admission she was encephalopathic and did not communicate.  Today she is verbalizing, she is able  to carry on a simple conversation in German.     Bedbound, elderly woman in NAD  vs, as above.   Neck, supple  Lungs, bilateral air entry  Cor, RRR  Abdomen, soft  Neurological, responsive, verbal, but not conversant.  Left hemiparesis and left facial weakness with left hyperreflexia.                         13.3   6.77  )-----------( 241      ( 16 May 2021 11:20 )             39.5   05-16    141  |  107  |  14  ----------------------------<  119<H>  3.6   |  26  |  0.79    Ca    8.8      16 May 2021 11:20    TPro  6.8  /  Alb  2.6<L>  /  TBili  1.2  /  DBili  x   /  AST  34  /  ALT  37  /  AlkPhos  176<H>  05-16      < from: Xray Chest 1 View- PORTABLE-Routine (Xray Chest 1 View- PORTABLE-Routine in AM.) (05.16.21 @ 11:18) >    Increased bronchovascular markings. No consolidation.    No pleural effusion or pneumothorax.    < end of copied text >    < from: CT Head No Cont (05.14.21 @ 21:31) >    No osseous abnormality seen.    IMPRESSION: No intracranial hemorrhage or mass effect. Encephalomalacia/gliosis in the left frontal lobe, likely due to prior infarct. Chronic right inferior cerebellar lacunar infarct.    < end of copied text >    Culture Results:   >100,000 CFU/ml Gram Negative Rods (05.15.21 @ 03:19)   Organism: Klebsiella pneumoniae (05.15.21 @ 03:19)     c- Cefazolin: S <=2     A1C with Estimated Average Glucose Result: 14.9: Method: Immunoassay   lLipid Profile in AM (05.15.21 @ 07:11)   Cholesterol, Serum: 192 mg/dL   Triglycerides, Serum: 173 mg/dL   HDL Cholesterol, Serum: 37 mg/dL     IMP:  Encephalopathy in a patient with progressive dementia, mental status likely exacerbated by toxicity of UTI. Improving clinically.           Hx CVA, findings c/w old CVA.  r/o seizure.           DM with poor outpatient control of glucose          Bedbound patient with progressive dementia.           Normal cholesterol may reflect protein-calorie malnutrition  Plan:  SW has been in contact with patient's family.   Family intends to take patient home.            Continue ceftriaxone.  Switch to ceftin 500 mg bid to complete 10 days for complicated UTI (encephalopathy).            EEG.  No further imaging, at present.           Speech/Swallow evaluation, appreciated.  Mechanical soft with thin liquids recommended.            Palliative Care consultation, appreciated.            May be able to control glucose on oral hypoglycemics after discharge.
Patient seen and examined this morning with medical team. Continue ceftriaxone for UTI, will switch to Ceftin 500 bid on discharge to complete a total of 10 days of abx. MRI showed Acute small right temporal lobe and left occipital lobe infarctions. EEG showed evidence of L>R temporal focal dysfunction and increased irritability and potential for seizures. There is evidence of structural abnormality in the L>R frontotemporal region. There is also evidence of diffuse cerebral dysfunction that is not specific for etiology. After discussion with Palliative, Dr. Dick and daughter Leeanne, as well as follow up with the above findings, daughter decided for comfort measures only, no further work up of new stroke, and would like to take her mom home with home hospice. Hospice referral sent, will likely discharge tomorrow if home hospice arranged.    Rest as per resident's note.

## 2021-05-21 NOTE — PROGRESS NOTE ADULT - SUBJECTIVE AND OBJECTIVE BOX
PGY-1 Progress Note discussed with attending    PAGER #: [-----] TILL 5:00 PM  PLEASE CONTACT ON CALL TEAM:  - On Call Team (Please refer to Dieter) FROM 5:00 PM - 8:30PM  - Nightfloat Team FROM 8:30 -7:30 AM    INTERVAL HPI/OVERNIGHT EVENTS: No acute events overnight.    MEDICATIONS:  diphenhydrAMINE 50 milliGRAM(s) Oral every 6 hours PRN      REVIEW OF SYSTEMS:  CONSTITUTIONAL: No fever, weight loss, or fatigue  RESPIRATORY: No cough, wheezing, chills or hemoptysis; No shortness of breath  CARDIOVASCULAR: No chest pain, palpitations, dizziness, or leg swelling  GASTROINTESTINAL: No abdominal pain. No nausea, vomiting, or hematemesis; No diarrhea or constipation. No melena or hematochezia.  GENITOURINARY: No dysuria or hematuria, urinary frequency  NEUROLOGICAL: No headaches, memory loss, loss of strength, numbness, or tremors  SKIN: No itching, burning, rashes, or lesions     Vital Signs Last 24 Hrs  T(C): 36.7 (21 May 2021 04:54), Max: 36.8 (20 May 2021 20:51)  T(F): 98 (21 May 2021 04:54), Max: 98.3 (20 May 2021 20:51)  HR: 70 (21 May 2021 04:54) (70 - 93)  BP: 126/62 (21 May 2021 04:54) (126/62 - 169/91)  BP(mean): --  RR: 18 (21 May 2021 04:54) (18 - 18)  SpO2: 100% (21 May 2021 04:54) (96% - 100%)    PHYSICAL EXAMINATION:  Mews exam                      CAPILLARY BLOOD GLUCOSE      RADIOLOGY & ADDITIONAL TESTS:

## 2021-05-21 NOTE — PROGRESS NOTE ADULT - PROBLEM SELECTOR PLAN 5
-s/p COVID infx 11/2020  -adm COVID PCR negative   -CXR with mild b/l infiltrates, possibly residual finding
-history and management as above

## 2021-05-21 NOTE — DISCHARGE NOTE NURSING/CASE MANAGEMENT/SOCIAL WORK - PATIENT PORTAL LINK FT
You can access the FollowMyHealth Patient Portal offered by [facility] by registering at the following website: [email or web address]. By joining [facility]’s FollowMyHealth portal, you will also be able to view your health information using other applications (apps) compatible with our system.

## 2021-05-21 NOTE — PROGRESS NOTE ADULT - ASSESSMENT
80F from home with daughters, arrived from [facility] 2/21, ambulates only with assistance with PMH DM, HTN, CVA 2019 (residual l-sided weakness), COVID 11/20, and Alzheimer dementia (baseline AAOx0-2), sent by PCP Dr. Gilmore 5/14 for nonspecific reports of "feeling poorly", cognitive decline, and x2 days questionably worsening weakness, admitted to tele for workup of worsening mental status. Patient is for home hospice. Comfort care.

## 2021-05-21 NOTE — PROGRESS NOTE ADULT - PROBLEM SELECTOR PLAN 6
-s/p COVID infx 11/2020  -adm COVID PCR negative   -CXR with mild b/l infiltrates, possibly residual finding
-no home meds, as discussed above  -on low-dose lisinopril with parameters, monitor Cr
-on low-dose lisinopril with parameters, monitor Cr
-s/p COVID infx 11/2020  -adm COVID PCR negative   -CXR with mild b/l infiltrates, possibly residual finding
-s/p COVID infx 11/2020  -adm COVID PCR negative   -CXR with mild b/l infiltrates, possibly residual finding
-no home meds, as discussed above  -start low-dose lisinopril with parameters, monitor Cr

## 2021-05-22 VITALS
DIASTOLIC BLOOD PRESSURE: 63 MMHG | TEMPERATURE: 98 F | RESPIRATION RATE: 18 BRPM | OXYGEN SATURATION: 97 % | HEART RATE: 68 BPM | SYSTOLIC BLOOD PRESSURE: 107 MMHG

## 2021-05-22 LAB — VIT B1 SERPL-MCNC: 155.1 NMOL/L — SIGNIFICANT CHANGE UP (ref 66.5–200)

## 2021-05-22 PROCEDURE — 92610 EVALUATE SWALLOWING FUNCTION: CPT

## 2021-05-22 PROCEDURE — 83036 HEMOGLOBIN GLYCOSYLATED A1C: CPT

## 2021-05-22 PROCEDURE — 84443 ASSAY THYROID STIM HORMONE: CPT

## 2021-05-22 PROCEDURE — 83735 ASSAY OF MAGNESIUM: CPT

## 2021-05-22 PROCEDURE — 82607 VITAMIN B-12: CPT

## 2021-05-22 PROCEDURE — 99285 EMERGENCY DEPT VISIT HI MDM: CPT | Mod: 25

## 2021-05-22 PROCEDURE — 93005 ELECTROCARDIOGRAM TRACING: CPT

## 2021-05-22 PROCEDURE — 86780 TREPONEMA PALLIDUM: CPT

## 2021-05-22 PROCEDURE — 87086 URINE CULTURE/COLONY COUNT: CPT

## 2021-05-22 PROCEDURE — 82306 VITAMIN D 25 HYDROXY: CPT

## 2021-05-22 PROCEDURE — 36415 COLL VENOUS BLD VENIPUNCTURE: CPT

## 2021-05-22 PROCEDURE — 82746 ASSAY OF FOLIC ACID SERUM: CPT

## 2021-05-22 PROCEDURE — 96376 TX/PRO/DX INJ SAME DRUG ADON: CPT

## 2021-05-22 PROCEDURE — 84100 ASSAY OF PHOSPHORUS: CPT

## 2021-05-22 PROCEDURE — 96375 TX/PRO/DX INJ NEW DRUG ADDON: CPT

## 2021-05-22 PROCEDURE — 95957 EEG DIGITAL ANALYSIS: CPT

## 2021-05-22 PROCEDURE — 87077 CULTURE AEROBIC IDENTIFY: CPT

## 2021-05-22 PROCEDURE — 82652 VIT D 1 25-DIHYDROXY: CPT

## 2021-05-22 PROCEDURE — 87635 SARS-COV-2 COVID-19 AMP PRB: CPT

## 2021-05-22 PROCEDURE — 80061 LIPID PANEL: CPT

## 2021-05-22 PROCEDURE — 70450 CT HEAD/BRAIN W/O DYE: CPT

## 2021-05-22 PROCEDURE — 85025 COMPLETE CBC W/AUTO DIFF WBC: CPT

## 2021-05-22 PROCEDURE — 97161 PT EVAL LOW COMPLEX 20 MIN: CPT

## 2021-05-22 PROCEDURE — 86769 SARS-COV-2 COVID-19 ANTIBODY: CPT

## 2021-05-22 PROCEDURE — 87186 SC STD MICRODIL/AGAR DIL: CPT

## 2021-05-22 PROCEDURE — 96374 THER/PROPH/DIAG INJ IV PUSH: CPT

## 2021-05-22 PROCEDURE — 84425 ASSAY OF VITAMIN B-1: CPT

## 2021-05-22 PROCEDURE — 99239 HOSP IP/OBS DSCHRG MGMT >30: CPT | Mod: GC

## 2021-05-22 PROCEDURE — 85027 COMPLETE CBC AUTOMATED: CPT

## 2021-05-22 PROCEDURE — 87040 BLOOD CULTURE FOR BACTERIA: CPT

## 2021-05-22 PROCEDURE — 95819 EEG AWAKE AND ASLEEP: CPT

## 2021-05-22 PROCEDURE — 81001 URINALYSIS AUTO W/SCOPE: CPT

## 2021-05-22 PROCEDURE — 71045 X-RAY EXAM CHEST 1 VIEW: CPT

## 2021-05-22 PROCEDURE — 80053 COMPREHEN METABOLIC PANEL: CPT

## 2021-05-22 PROCEDURE — 70551 MRI BRAIN STEM W/O DYE: CPT

## 2021-05-22 PROCEDURE — 82962 GLUCOSE BLOOD TEST: CPT

## 2021-05-22 NOTE — CHART NOTE - NSCHARTNOTEFT_GEN_A_CORE
Comfort care   ----------------    I called Leeanne, Patient's daughter ( [phone] ) to inform her regarding MRI finding. Patient understood the discussion. Agreed the patient will be comfort care. No further work up. No further medications. Just IV fluids is allowed if unable to tolerate diet as per daughter. MOLST form was updated. Patient will be discharged home hospice.
Paged by RN about Pt ripping off tele box and other wires/ lines. Pt agitated with /95, not able to be verbally reoriented, and not in any focal pain. Gave zyprexa Im 2.5mg, and will monitor. Primary team to be aware.
Called pharmacy to get home medications for the patient. I was told she is only on lactulose.    Called the family and the daughter confirmed that she isn't on any home meds except for lactulose. Med rec updated.
On Call KELTON request due to issue with DC transportation. Patient was initially scheduled for DC home on 5/21 however she was not picked up by [facility]. KELTON called [facility] transport [phone] as per Garrett due to an error [facility] scheduled the transport for today 5/22 at 5pm. SW called [facility] and informed them of the new  time. KELTON contacted patients' daughter, Leeanne [phone] to inform her of the new DC time. Leeanne is in agreement with transport.    No further SW services needed at this time.
Patient will leave today by 5pm. Arranged with SW and daughter.0

## 2021-05-27 RX ORDER — LACTULOSE 10 G/15ML
15 SOLUTION ORAL
Qty: 225 | Refills: 0
Start: 2021-05-27 | End: 2021-06-10

## 2021-05-27 RX ORDER — DIPHENHYDRAMINE HCL 50 MG
1 CAPSULE ORAL
Qty: 120 | Refills: 0
Start: 2021-05-27 | End: 2021-06-25

## 2021-07-01 PROCEDURE — G9005: CPT

## 2022-06-22 NOTE — PATIENT PROFILE ADULT - NSTRANSFERBELONGINGSDISPO_GEN_A_NUR
"PC with patient and discussion. Pt reports that she previously was on a drug that started with a \"P\", but is no longer taking. Documentation from OV 6/2021 with cards shows Pravastatin started. Pt reports that her recent blood work with Dr. Tian, everything was fine and no further recommendations. Patient reports that she is doing Biometrics for weight loss, and has lost some weight, but a slow and steady process. She will keep working on weight loss. She prefers that at her next visit with Dr. Tian on August 30 th, to talk it over with Dr. Tian as she would like both of her physicians on the same page. She would prefer to not take any statin drugs. Informed patient will forward to EMG to inform. Encouraged patient to discuss with Dr. Tian. Please keep appointment with PCP. Also, due for cardiology follow-up in March of 2023, but call sooner if new issues or concerns. Patient appreciative of the follow-up. LUDIVINARn   " with patient

## 2023-04-12 NOTE — ED PROVIDER NOTE - NS ED ROS FT
-- DO NOT REPLY / DO NOT REPLY ALL --  -- Message is from Engagement Center Operations (ECO) --    Request Result  Is the patient currently having Emergent symptoms?: No     Which result are you requesting?: BLOOD/URINE    What is the full name of the provider that ordered the lab or test?: Bulmaro Brewer    Caller Information       Type Contact Phone/Fax    04/12/2023 09:10 AM CDT Phone (Incoming) Daina Rivers (Self) [phone] (M)          Alternative phone number: no. Patient would like a call back as soon as possible    Clinic site name / Account # for ordering provider: [facility]    Can a detailed message be left?: Yes    Message Turnaround: [place]:    Refer to site's KB page for routing instructions    Please give this turnaround time to the caller:   \"You can expect to receive a response 1-3 business days after your provider's clinical team reviews the message\"    Inform patients: \"Please be aware the return phone call may come from an unidentified or out of state phone number.\"   left side weakness+

## 2023-05-17 NOTE — ED PROVIDER NOTE - PROGRESS NOTE DETAILS
equal bilaterally
Patient appeared confused, made multiple attempts to get out of bed, unable to verbally de-escalate, requires sedation for patient safety, admitted for weakness and ams, natalit currently aox2

## 2023-06-28 NOTE — ED ADULT NURSE NOTE - CHIEF COMPLAINT
What Type Of Note Output Would You Prefer (Optional)?: Standard Output
How Severe Is Your Skin Lesion?: mild
Is This A New Presentation, Or A Follow-Up?: Skin Lesion
The patient is a 80y Female complaining of weakness.

## 2024-02-06 NOTE — PATIENT PROFILE ADULT - NSPROGENSOURCEINFO_GEN_A_NUR
[Time Spent: ___ minutes] : I have spent [unfilled] minutes of time on the encounter.
facility verbal report